# Patient Record
Sex: FEMALE | Race: WHITE | Employment: OTHER | URBAN - METROPOLITAN AREA
[De-identification: names, ages, dates, MRNs, and addresses within clinical notes are randomized per-mention and may not be internally consistent; named-entity substitution may affect disease eponyms.]

---

## 2020-05-21 ENCOUNTER — HOSPITAL ENCOUNTER (OUTPATIENT)
Age: 70
Setting detail: OUTPATIENT SURGERY
Discharge: HOME | End: 2020-05-21
Attending: SPECIALIST | Admitting: SPECIALIST
Payer: MEDICARE

## 2020-05-21 VITALS
OXYGEN SATURATION: 97 % | HEART RATE: 66 BPM | RESPIRATION RATE: 13 BRPM | WEIGHT: 144.4 LBS | SYSTOLIC BLOOD PRESSURE: 110 MMHG | HEIGHT: 64 IN | DIASTOLIC BLOOD PRESSURE: 46 MMHG | BODY MASS INDEX: 24.65 KG/M2

## 2020-05-21 DIAGNOSIS — R07.9 CHEST PAIN, UNSPECIFIED TYPE: ICD-10-CM

## 2020-05-21 LAB
ANION GAP SERPL CALC-SCNC: 5 MMOL/L (ref 5–15)
BASOPHILS # BLD: 0 K/UL (ref 0–0.1)
BASOPHILS NFR BLD: 1 % (ref 0–1)
BUN SERPL-MCNC: 19 MG/DL (ref 6–20)
BUN/CREAT SERPL: 21 (ref 12–20)
CALCIUM SERPL-MCNC: 9.4 MG/DL (ref 8.5–10.1)
CHLORIDE SERPL-SCNC: 106 MMOL/L (ref 97–108)
CO2 SERPL-SCNC: 31 MMOL/L (ref 21–32)
CREAT SERPL-MCNC: 0.9 MG/DL (ref 0.55–1.02)
DIFFERENTIAL METHOD BLD: NORMAL
EOSINOPHIL # BLD: 0.3 K/UL (ref 0–0.4)
EOSINOPHIL NFR BLD: 5 % (ref 0–7)
ERYTHROCYTE [DISTWIDTH] IN BLOOD BY AUTOMATED COUNT: 12.4 % (ref 11.5–14.5)
GLUCOSE SERPL-MCNC: 104 MG/DL (ref 65–100)
HCT VFR BLD AUTO: 38.5 % (ref 35–47)
HGB BLD-MCNC: 13 G/DL (ref 11.5–16)
IMM GRANULOCYTES # BLD AUTO: 0 K/UL (ref 0–0.04)
IMM GRANULOCYTES NFR BLD AUTO: 0 % (ref 0–0.5)
LYMPHOCYTES # BLD: 1.6 K/UL (ref 0.8–3.5)
LYMPHOCYTES NFR BLD: 27 % (ref 12–49)
MAGNESIUM SERPL-MCNC: 2.5 MG/DL (ref 1.6–2.4)
MCH RBC QN AUTO: 32.3 PG (ref 26–34)
MCHC RBC AUTO-ENTMCNC: 33.8 G/DL (ref 30–36.5)
MCV RBC AUTO: 95.5 FL (ref 80–99)
MONOCYTES # BLD: 0.4 K/UL (ref 0–1)
MONOCYTES NFR BLD: 7 % (ref 5–13)
NEUTS SEG # BLD: 3.5 K/UL (ref 1.8–8)
NEUTS SEG NFR BLD: 60 % (ref 32–75)
NRBC # BLD: 0 K/UL (ref 0–0.01)
NRBC BLD-RTO: 0 PER 100 WBC
PLATELET # BLD AUTO: 167 K/UL (ref 150–400)
PMV BLD AUTO: 11.7 FL (ref 8.9–12.9)
POTASSIUM SERPL-SCNC: 3.9 MMOL/L (ref 3.5–5.1)
RBC # BLD AUTO: 4.03 M/UL (ref 3.8–5.2)
SODIUM SERPL-SCNC: 142 MMOL/L (ref 136–145)
WBC # BLD AUTO: 5.8 K/UL (ref 3.6–11)

## 2020-05-21 PROCEDURE — 83735 ASSAY OF MAGNESIUM: CPT

## 2020-05-21 PROCEDURE — 85025 COMPLETE CBC W/AUTO DIFF WBC: CPT

## 2020-05-21 PROCEDURE — 77030004532 HC CATH ANGI DX IMP BSC -A: Performed by: SPECIALIST

## 2020-05-21 PROCEDURE — 80048 BASIC METABOLIC PNL TOTAL CA: CPT

## 2020-05-21 PROCEDURE — 93458 L HRT ARTERY/VENTRICLE ANGIO: CPT | Performed by: SPECIALIST

## 2020-05-21 PROCEDURE — 36415 COLL VENOUS BLD VENIPUNCTURE: CPT

## 2020-05-21 PROCEDURE — 99152 MOD SED SAME PHYS/QHP 5/>YRS: CPT | Performed by: SPECIALIST

## 2020-05-21 PROCEDURE — C1894 INTRO/SHEATH, NON-LASER: HCPCS | Performed by: SPECIALIST

## 2020-05-21 PROCEDURE — 74011636320 HC RX REV CODE- 636/320: Performed by: SPECIALIST

## 2020-05-21 PROCEDURE — 74011000250 HC RX REV CODE- 250: Performed by: SPECIALIST

## 2020-05-21 PROCEDURE — 74011250636 HC RX REV CODE- 250/636: Performed by: SPECIALIST

## 2020-05-21 PROCEDURE — C1760 CLOSURE DEV, VASC: HCPCS | Performed by: SPECIALIST

## 2020-05-21 RX ORDER — CHOLECALCIFEROL (VITAMIN D3) 50 MCG
1 CAPSULE ORAL DAILY
COMMUNITY

## 2020-05-21 RX ORDER — SODIUM CHLORIDE 9 MG/ML
125 INJECTION, SOLUTION INTRAVENOUS CONTINUOUS
Status: DISCONTINUED | OUTPATIENT
Start: 2020-05-21 | End: 2020-05-21 | Stop reason: HOSPADM

## 2020-05-21 RX ORDER — SODIUM CHLORIDE 0.9 % (FLUSH) 0.9 %
5-40 SYRINGE (ML) INJECTION AS NEEDED
Status: DISCONTINUED | OUTPATIENT
Start: 2020-05-21 | End: 2020-05-21 | Stop reason: HOSPADM

## 2020-05-21 RX ORDER — ROSUVASTATIN CALCIUM 5 MG/1
5 TABLET, COATED ORAL
COMMUNITY

## 2020-05-21 RX ORDER — LOSARTAN POTASSIUM AND HYDROCHLOROTHIAZIDE 12.5; 1 MG/1; MG/1
1 TABLET ORAL
COMMUNITY

## 2020-05-21 RX ORDER — MIDAZOLAM HYDROCHLORIDE 1 MG/ML
INJECTION, SOLUTION INTRAMUSCULAR; INTRAVENOUS AS NEEDED
Status: DISCONTINUED | OUTPATIENT
Start: 2020-05-21 | End: 2020-05-21 | Stop reason: HOSPADM

## 2020-05-21 RX ORDER — OMEPRAZOLE 40 MG/1
40 CAPSULE, DELAYED RELEASE ORAL DAILY
COMMUNITY

## 2020-05-21 RX ORDER — DEXTROMETHORPHAN HYDROBROMIDE, GUAIFENESIN 5; 100 MG/5ML; MG/5ML
650 LIQUID ORAL EVERY 8 HOURS
COMMUNITY
End: 2022-06-29

## 2020-05-21 RX ORDER — HYDROXYCHLOROQUINE SULFATE 200 MG/1
200 TABLET, FILM COATED ORAL DAILY
COMMUNITY
End: 2022-07-14

## 2020-05-21 RX ORDER — HEPARIN SODIUM 200 [USP'U]/100ML
INJECTION, SOLUTION INTRAVENOUS
Status: COMPLETED | OUTPATIENT
Start: 2020-05-21 | End: 2020-05-21

## 2020-05-21 RX ORDER — LORAZEPAM 0.5 MG/1
0.5 TABLET ORAL
COMMUNITY

## 2020-05-21 RX ORDER — CHOLECALCIFEROL (VITAMIN D3) 125 MCG
1000 CAPSULE ORAL DAILY
COMMUNITY
End: 2022-06-29

## 2020-05-21 RX ORDER — SPIRONOLACTONE 25 MG
1 TABLET ORAL
COMMUNITY

## 2020-05-21 RX ORDER — GUAIFENESIN 100 MG/5ML
81 LIQUID (ML) ORAL DAILY
COMMUNITY
End: 2022-07-14

## 2020-05-21 RX ORDER — FENTANYL CITRATE 50 UG/ML
INJECTION, SOLUTION INTRAMUSCULAR; INTRAVENOUS AS NEEDED
Status: DISCONTINUED | OUTPATIENT
Start: 2020-05-21 | End: 2020-05-21 | Stop reason: HOSPADM

## 2020-05-21 RX ORDER — DIPHENHYDRAMINE HYDROCHLORIDE 50 MG/ML
25 INJECTION, SOLUTION INTRAMUSCULAR; INTRAVENOUS
Status: DISCONTINUED | OUTPATIENT
Start: 2020-05-21 | End: 2020-05-21 | Stop reason: HOSPADM

## 2020-05-21 RX ORDER — GLUCOSAMINE/CHONDR SU A SOD 750-600 MG
5000 TABLET ORAL DAILY
COMMUNITY

## 2020-05-21 RX ORDER — MIRTAZAPINE 15 MG/1
15 TABLET, FILM COATED ORAL
COMMUNITY

## 2020-05-21 RX ORDER — SODIUM CHLORIDE 0.9 % (FLUSH) 0.9 %
5-40 SYRINGE (ML) INJECTION EVERY 8 HOURS
Status: DISCONTINUED | OUTPATIENT
Start: 2020-05-21 | End: 2020-05-21 | Stop reason: HOSPADM

## 2020-05-21 RX ORDER — ASCORBIC ACID 500 MG
500 TABLET ORAL DAILY
COMMUNITY

## 2020-05-21 RX ORDER — LIDOCAINE HYDROCHLORIDE 10 MG/ML
INJECTION INFILTRATION; PERINEURAL AS NEEDED
Status: DISCONTINUED | OUTPATIENT
Start: 2020-05-21 | End: 2020-05-21 | Stop reason: HOSPADM

## 2020-05-21 RX ORDER — HYDROCORTISONE SODIUM SUCCINATE 100 MG/2ML
100 INJECTION, POWDER, FOR SOLUTION INTRAMUSCULAR; INTRAVENOUS
Status: DISCONTINUED | OUTPATIENT
Start: 2020-05-21 | End: 2020-05-21 | Stop reason: HOSPADM

## 2020-05-21 RX ADMIN — SODIUM CHLORIDE 75 ML/HR: 900 INJECTION, SOLUTION INTRAVENOUS at 10:38

## 2020-05-21 NOTE — ROUTINE PROCESS
Cardiac Cath Lab Recovery Arrival Note:      Tomas Babcock arrived to Cardiac Cath Lab, Recovery Area. Staff introduced to patient. Patient identifiers verified with NAME and DATE OF BIRTH. Procedure verified with patient. Consent forms reviewed and signed by patient or authorized representative and verified. Allergies verified. Patient and family oriented to department. Patient and family informed of procedure and plan of care. Questions answered with review. Patient prepped for procedure, per orders from physician, prior to arrival.    Patient on cardiac monitor, non-invasive blood pressure, SPO2 monitor. On room air. . Patient is A&Ox 3. Patient reports no CP. Patient in stretcher, in low position, with side rails up, call bell within reach, patient instructed to call if assistance as needed. Patient prep in: [facility] Recovery Area, [address]  Prep by: GARFIELD      [id number]    TRANSFER - OUT REPORT:    Verbal report given to Ed on Tomas Babcock being transferred to procedure room for ordered procedure       Report consisted of patients Situation, Background, Assessment and   Recommendations(SBAR). Information from the following report(s) SBAR was reviewed with the receiving nurse. Opportunity for questions and clarification was provided. 1430    Pt. discharge with questions answered and pt/family verbalized understanding. Will call if any questions arise. Patient transported via wheelchair and RN to vehicle.

## 2020-05-21 NOTE — Clinical Note
Patient transported with a Registered Nurse. TRANSFER - OUT REPORT:    Verbal report given to  Odette(name) on [facility]  [place] [facility] [place] of care       Report consisted of patients Situation, Background, Assessment and   Recommendations(SBAR). Information from the following report(s) SBAR was reviewed with the receiving nurse. Lines: @LDA(4,5,6,7,8,12)@     Opportunity for questions and clarification was provided.       Patient transported with:   Registered Nurse

## 2020-05-21 NOTE — DISCHARGE INSTRUCTIONS
Discharge Instructions:     Medications: Activity:     As tolerated except do not lift over 10 pounds for 5 days. Diet:     American Heart Association. Follow-up:     Follow up with Bashir Rios MD on June 4th, 2020 at 9:30am.  [address] [facility] [address]  [phone]      If you smoke, STOP!

## 2020-05-21 NOTE — H&P
Date of Surgery Update:  Lorie Sexton was seen and examined. History and physical has been reviewed. The patient has been examined. There have been no significant clinical changes since the completion of the originally dated History and Physical.    Signed By: Jaimee Diehl MD     May 21, 2020 11:24 AM       CP/SOB. Exercise echo + septum. Lieutenant Jackson 1950   Office/Outpatient Visit  Visit Date: Wed, May 20, 2020 8:30 am  Provider: Mamta Sterling MD (Assistant: Evan Manrique, RN )  Location: [facility])[address] [place]. [address] [phone]    Electronically signed by Edith Meneses MD on  05/20/2020 09:49:47 AM                           Subjective:    CC: Ms. Ashley Vaz is a 68year old White female. Her primary care physician is Angelika Zambrano MD.  Angelika Zambrano MD referred Ms. Pollard to the practice for a consult. She is here to follow up with the doctor regarding previous testing, stress echocardiogram - 5/20/20. She presents today with a complaint of shortness of breath. She is here today following a transition of care from her primary care provider. Medication list reviewed. She has a history of hypertension. HPI:           Regarding hypertension:  Type Primary Hypertension Currently, her treatment regimen consists of an angiotensin receptor blocker ( losartan ) and a diuretic ( hydrochlorothiazide ). Regarding dyspnea/shortness of breath: Associated symptoms include fatigue. MD Notes: Abnormal stress echo: Positive chest pain, positive ECG, septal hypokinesis. Mild MR, mild-moderate TR. Abnormal result of other cardiovascular function study noted. Coronary Artery Disease: Current symptoms include chest pain and shortness of breath. She denies lower extremity edema. Hypercholesterolemia: Current treatment includes Crestor.             Regarding chest pain:   The discomfort is located primarily in the left parasternal region. The pain initially began one year ago. Typically, individual episodes of chest pain are variable in duration. She characterizes the pain as dull. It seems to be worse with exertion. Pain improves with rest.  Associated symptoms include dyspnea. ROS:   GENERAL:  Denies recent weight gain or weight loss. EYES:  Denies double vision. ENT:  Denies tinnitus. No nose bleeding. ENDOCRINE:  Negative for temperature intolerances and excessive sweating. CARDIOVASCULAR:  Please see HPI. RESPIRATORY:   No chronic cough, hemoptysis or pleuritic pain. GI:  No nausea, vomiting, hematemesis, diarrhea or melena. :  No dysuria, polyuria or hematuria. HEMATOLOGIC/LYMPHATIC:  Negative for easy bruising and excessive bleeding. NEUROLOGICAL:  Negative for seizures and vertigo. Past Medical History / Family History / Social History:     Last Reviewed on 2020 08:52 AM by Kira Flores  Past Medical History:     Hypertension   Sleep Apnea: uses CPAP;   Gastroesophageal Reflux Disease   Rheumatoid Arthritis     Surgical History:   Surgical/Procedural History:   Appendectomy  Cataract Removal  SCC excision x 1  Multiple BCE excision -      Family History:   Father:   ; Positive for Lung Cancer; Mother:  Positive for Coronary Artery Disease;   ; Positive for Breast Cancer;   Sister(s):  Positive for Hypertension;     Social History:   Social History:   Occupation: Retired   Marital Status:    Children: 2 children     Tobacco/Alcohol/Supplements:   Last Reviewed on 2020 08:52 AM by Alissa Taylor  TOBACCO/ALCOHOL/SUPPLEMENTS   Tobacco: Past history of cigarette smoking, but has quit. Alcohol: Non-drinker   Caffeine:  She admits to consuming caffeine via tea ( 2 servings per day ).       Substance Abuse History:   Last Reviewed on 2020 08:52 AM by Alissa Smith  Substance Use/Abuse:   None     Mental Health History:   Last Reviewed on 2020 08:52 AM by Alissa Smith    Communicable Diseases (eg STDs): Last Reviewed on 5/20/2020 08:52 AM by Alissa Smith    Current Problems:   Last Reviewed on 5/20/2020 08:52 AM by Alissa Smith  Essential (primary) hypertension  Shortness of breath  Chest pain, unspecified  Abnormal stress echocardiogram (ECHO)  Shortness of breath    Allergies:   Last Reviewed on 5/20/2020 08:52 AM by Alissa Smith  Penicillins:    Ceclor:    Keflex:    Mobic:      Current Medications:   Last Reviewed on 5/20/2020 08:52 AM by Alissa Smith  losartan-hydrochlorothiazide 100-12.5 mg oral tablet [take 1 tablet by oral route once daily]  aspirin 81 mg oral tablet, delayed release (enteric coated) [take 1 tablet (81 mg) by oral route once daily]  rosuvastatin 5 mg oral tablet [take 1 tablet (5 mg) by oral route once daily]  omeprazole 40 mg oral capsule,delayed release (enteric coated) [take 1 capsule (40 mg) by oral route once daily before a meal]  LORazepam 0.5 mg oral tablet [1 po q8h prn]  mirtazapine 15 mg oral tablet [take 1 tablet (15 mg) by oral route once daily before bedtime]  hydroxychloroquine 200 mg oral tablet [take 1 tablet (200 mg) by oral route once daily]  Multivitamin    Biotin   Vitamin C   glucosamine-chondroitin   Fish oil     Objective:    Vitals:     Current: 5/20/2020 8:53:07 AM  Ht:  5 ft, 4 in; Wt: 162 lbs;  BMI: 27. 8BP: 119/69 mm Hg (left arm, sitting);  P: 78 bpm    Repeat:   8:53:15 AM  BP:   129/71mm Hg (left arm, standing)   Exams:   GENERAL:  Alert, oriented to person, place and time. HEENT:  Pinkish palpebral  conjunctivae. Anicteric sclerae. NECK:  No jugular vein engorgement. No bruit. CHEST: Equal expansion. Clear breath sounds. No rales, no wheezing. Heart: Reg rate and rhythm. Grade 2/6 systolic ejection murmur at the left sternal border area. ABDOMEN:  Soft. Normal active bowel sounds. No tenderness. EXTREMITIES:  No pitting pedal edema. Equal pulses bilaterally.     NEURO: Grossly intact. Assessment:     I10   Essential (primary) hypertension     R06.02   Shortness of breath     R94.39   Abnormal result of other cardiovascular function study     I25.118   Atherosclerotic heart disease of native coronary artery with other forms of angina pectoris     E78.00   Pure hypercholesterolemia, unspecified     R07.2   Precordial pain       ORDERS:     Procedures Ordered:     53707  Education and train for pt self-mgmt by qualified, nonphysician, ea 30 minutes; individual pt  (Send-Out)          Reinaldo Castañeda  Cardiac Cath  (In-House)          Other Orders:     [id number]  Queried Patient for Tobacco Use  (Send-Out)          4086F  Aspirin or clopidogrel prescribed (CAD)  (Send-Out)              Plan:     Precordial pain  CAD # 6: CAD w/ Antiplatelet Therapy Aspirin or Clopidogrel Prescribed 1. Medication list has been reviewed. Start the following medication(s):  metoprolol. Smoking Status:  Nonsmoker   2. Advised the patient regarding diet, exercise, and lifestyle modification. 3.  The patient to call the office if there is any change in her cardiac symptoms. 4.  Explained to the patient the importance of controlling her cardiac risk factors. Testing/Procedures: Cardiac Catheterization  Explained to the patient the indication, procedure, risks, and benefits of cardiac catheterization. The patient understands  and wishes to proceed with the cath to be performed as an outpatient this week at [facility] by Dr. Venu Guillermo. Schedule a follow up appointment in 2 weeks.         Orders:     [id number]  Queried Patient for Tobacco Use  (Send-Out)          26260  Education and train for pt self-mgmt by qualified, nonphysician, ea 30 minutes; individual pt  (Send-Out)          XCATH  Cardiac Cath  (In-House)          1709E  Aspirin or clopidogrel prescribed (CAD)  (Send-Out)            Other Patient Education Handouts:     COV Heart Healthy Diet      Patient Recommendations: For  Precordial pain:    1. Your medication list has been reviewed. Start the following medication(s):  metoprolol. 2.  You have been advised regarding diet, exercise, and lifestyle. modification. 3.  Please call the office if there is any change in your cardiac symptoms. 4.  Explained to you the importance of controlling your cardiac risk factors. You need to have the following test/procedure(s) done: Cardiac Catheterization:  The patient understands and wishes to proceed with the cath to be performed as an outpatient this week. at [facility] by Dr. Ele Maurer. Schedule a follow-up visit in 2 weeks.

## 2020-05-21 NOTE — Clinical Note
TRANSFER - IN REPORT:    Verbal report received from Saira Mckeon CLPO(name) on [facility]  being received from clpo(unit) for routine progression of care      Report consisted of patients Situation, Background, Assessment and   Recommendations(SBAR). Information from the following report(s) SBAR was reviewed with the receiving nurse. Opportunity for questions and clarification was provided. Assessment completed upon patients arrival to unit and care assumed.

## 2020-05-21 NOTE — Clinical Note
Single view of the left ventricle obtained using power injection. Total volume = 30 mL. Rate = 10 mL/sec. Pressure = 900 PSI. Rate of rise = 0.5 sec.

## 2020-05-21 NOTE — PROCEDURES
Cath:  Obstructive single-vessel (small vessel) disease     LAD patent, but occluded small septal (tooo small for PCI). LCx patent     RCA patent  Normal LVF (EF 60%). No AVG, MR 1+.   RFA angioseal.    Med mgmt  Cont BB, ASA, statin    F/U with Dr. Nini Worthy 6/4/20 @ 9:30am.

## 2021-12-16 ENCOUNTER — OFFICE VISIT (OUTPATIENT)
Dept: ORTHOPEDIC SURGERY | Age: 71
End: 2021-12-16
Payer: MEDICARE

## 2021-12-16 VITALS — HEIGHT: 64 IN | WEIGHT: 160 LBS | BODY MASS INDEX: 27.31 KG/M2

## 2021-12-16 DIAGNOSIS — M17.11 ARTHRITIS OF KNEE, RIGHT: ICD-10-CM

## 2021-12-16 DIAGNOSIS — M17.12 ARTHRITIS OF LEFT KNEE: Primary | ICD-10-CM

## 2021-12-16 PROCEDURE — 1101F PT FALLS ASSESS-DOCD LE1/YR: CPT | Performed by: ORTHOPAEDIC SURGERY

## 2021-12-16 PROCEDURE — G8427 DOCREV CUR MEDS BY ELIG CLIN: HCPCS | Performed by: ORTHOPAEDIC SURGERY

## 2021-12-16 PROCEDURE — 20610 DRAIN/INJ JOINT/BURSA W/O US: CPT | Performed by: ORTHOPAEDIC SURGERY

## 2021-12-16 PROCEDURE — 3017F COLORECTAL CA SCREEN DOC REV: CPT | Performed by: ORTHOPAEDIC SURGERY

## 2021-12-16 PROCEDURE — 1090F PRES/ABSN URINE INCON ASSESS: CPT | Performed by: ORTHOPAEDIC SURGERY

## 2021-12-16 PROCEDURE — G8400 PT W/DXA NO RESULTS DOC: HCPCS | Performed by: ORTHOPAEDIC SURGERY

## 2021-12-16 PROCEDURE — G8432 DEP SCR NOT DOC, RNG: HCPCS | Performed by: ORTHOPAEDIC SURGERY

## 2021-12-16 PROCEDURE — G8419 CALC BMI OUT NRM PARAM NOF/U: HCPCS | Performed by: ORTHOPAEDIC SURGERY

## 2021-12-16 PROCEDURE — G8536 NO DOC ELDER MAL SCRN: HCPCS | Performed by: ORTHOPAEDIC SURGERY

## 2021-12-16 PROCEDURE — 99214 OFFICE O/P EST MOD 30 MIN: CPT | Performed by: ORTHOPAEDIC SURGERY

## 2021-12-16 RX ORDER — METHYLPREDNISOLONE ACETATE 80 MG/ML
80 INJECTION, SUSPENSION INTRA-ARTICULAR; INTRALESIONAL; INTRAMUSCULAR; SOFT TISSUE ONCE
Status: COMPLETED | OUTPATIENT
Start: 2021-12-16 | End: 2021-12-16

## 2021-12-16 RX ORDER — ISOSORBIDE DINITRATE 5 MG/1
30 TABLET ORAL DAILY
COMMUNITY

## 2021-12-16 RX ORDER — BISMUTH SUBSALICYLATE 262 MG
1 TABLET,CHEWABLE ORAL DAILY
COMMUNITY

## 2021-12-16 RX ADMIN — METHYLPREDNISOLONE ACETATE 80 MG: 80 INJECTION, SUSPENSION INTRA-ARTICULAR; INTRALESIONAL; INTRAMUSCULAR; SOFT TISSUE at 15:32

## 2021-12-16 RX ADMIN — METHYLPREDNISOLONE ACETATE 80 MG: 80 INJECTION, SUSPENSION INTRA-ARTICULAR; INTRALESIONAL; INTRAMUSCULAR; SOFT TISSUE at 15:33

## 2021-12-16 NOTE — PROGRESS NOTES
Dain Cruz (: 1950) is a 66 y.o. female patient, here for evaluation of the following chief complaint(s):  Knee Pain (bilateral knee pain)       ASSESSMENT/PLAN:  Below is the assessment and plan developed based on review of pertinent history, physical exam, labs, studies, and medications. 55-year-old female with bilateral knee osteoarthritis. She has done well with injections. She is requesting hyaluronic acid and steroid today. I injected gel 1 as well as 80 mg Depo-Medrol in each knee. She tolerated well. Follow-up as needed      1. Arthritis of left knee  -     DRAIN/INJECT LARGE JOINT/BURSA  2. Arthritis of knee, right  -     DRAIN/INJECT LARGE JOINT/BURSA      Encounter Diagnoses   Name Primary?  Arthritis of left knee Yes    Arthritis of knee, right         No follow-ups on file. SUBJECTIVE/OBJECTIVE:  Dian Cruz (: 1950) is a 66 y.o. female who presents today for the following:  Chief Complaint   Patient presents with    Knee Pain     bilateral knee pain       55-year-old female comes today for follow-up. She has bilateral knee osteoarthritis. She has been treating it with conservative treatment including injections. These helped significantly. The pain is rated as moderate described as sharp. She can walk less than 5 blocks without pain. She has also had physical therapy in the past.    IMAGING:  XR Results (most recent):  No results found for this or any previous visit. Allergies   Allergen Reactions    Ceclor [Cefaclor] Rash    Keflex [Cephalexin] Rash    Mobic [Meloxicam] Hives    Penicillamine Rash       Current Outpatient Medications   Medication Sig    isosorbide dinitrate (ISORDIL) 5 mg tablet Take  by mouth three (3) times daily.  B infantis/B ani/B katlin/B bifid (PROBIOTIC 4X PO) Take  by mouth.  multivitamin (ONE A DAY) tablet Take 1 Tablet by mouth daily.  TURMERIC PO Take  by mouth.     glucosamine sulfate (GLUCOSAMINE PO) Take  by mouth.  CRANBERRY PO Take  by mouth.  losartan-hydroCHLOROthiazide (HYZAAR) 100-12.5 mg per tablet Take 1 Tab by mouth daily.  mirtazapine (REMERON) 15 mg tablet Take 15 mg by mouth nightly.  hydroxychloroquine (PLAQUENIL) 200 mg tablet Take 200 mg by mouth daily.  rosuvastatin (CRESTOR) 5 mg tablet Take 5 mg by mouth nightly.  Biotin 2,500 mcg cap Take 5 g by mouth daily.  omega 3-dha-epa-fish oil (Fish Oil) 100-160-1,000 mg cap Take 1 Cap by mouth daily.  Calcium-Cholecalciferol, D3, (Calcium 600 with Vitamin D3) 600 mg(1,500mg) -400 unit chew Take 1 Tab by mouth.  aspirin 81 mg chewable tablet Take 81 mg by mouth daily.  cholecalciferol, vitamin D3, 50 mcg (2,000 unit) tab Take 1,000 Int'l Units by mouth daily.  acetaminophen (TYLENOL) 650 mg TbER Take 650 mg by mouth every eight (8) hours.  ascorbic acid, vitamin C, (Vitamin C) 500 mg tablet Take 500 mg by mouth daily.  omeprazole (PRILOSEC) 40 mg capsule Take 40 mg by mouth daily. (Patient not taking: Reported on 12/16/2021)    LORazepam (ATIVAN) 0.5 mg tablet Take 0.5 mg by mouth.  (Patient not taking: Reported on 12/16/2021)     Current Facility-Administered Medications   Medication    methylPREDNISolone acetate (DEPO-MEDROL) 80 mg/mL injection 80 mg    hyaluronate sodium, cross-linked (GEL-ONE) injection syrg 30 mg    methylPREDNISolone acetate (DEPO-MEDROL) 80 mg/mL injection 80 mg    hyaluronate sodium, cross-linked (GEL-ONE) injection syrg 30 mg       Past Medical History:   Diagnosis Date    Hypertension     Rheumatoid arteritis ([address]         Past Surgical History:   Procedure Laterality Date    HX APPENDECTOMY         Family History   Problem Relation Age of Onset    Heart Disease Mother     Cancer Father         Social History     Tobacco Use    Smoking status: Former Smoker    Smokeless tobacco: Never Used   Substance Use Topics    Alcohol use: Not Currently        All systems reviewed x 12 and were negative with the exception of none      No flowsheet data found. Vitals:  Ht 5' 4\" (1.626 m)   Wt 160 lb (72.6 kg)   BMI 27.46 kg/m²    Body mass index is 27.46 kg/m². Physical Exam    General: NAD, well developed, well nourished, alert and oriented x 3. Cardiac: Extremities well perfused    Respiratory: Nonlabored breathing    LLE: Normal gait and station. Negative stinchfield. No effusion noted. No previous incisions noted. ROM 0-120 degrees. Grossly stable to varus/valgus stress and anterior/posterior drawer tests. Negative McMurrays. Motor grossly intact. RLE: Normal gait and station. Negative stinchfield. No effusion noted. No previous incisions noted. ROM 0-120 degrees. Grossly stable to varus/valgus stress and anterior/posterior drawer tests. Negative McMurrays. Motor grossly intact. Vascular: Palpable pedal pulses, equal bilaterally. Skin: Warm well perfused, cap refill < 2 sec. An electronic signature was used to authenticate this note.   -- Jonathan Sousa MD

## 2022-04-14 ENCOUNTER — OFFICE VISIT (OUTPATIENT)
Dept: ORTHOPEDIC SURGERY | Age: 72
End: 2022-04-14
Payer: MEDICARE

## 2022-04-14 VITALS — BODY MASS INDEX: 26.46 KG/M2 | WEIGHT: 155 LBS | HEIGHT: 64 IN

## 2022-04-14 DIAGNOSIS — M17.11 ARTHRITIS OF KNEE, RIGHT: ICD-10-CM

## 2022-04-14 DIAGNOSIS — G89.29 CHRONIC PAIN OF BOTH KNEES: Primary | ICD-10-CM

## 2022-04-14 DIAGNOSIS — M17.12 ARTHRITIS OF LEFT KNEE: ICD-10-CM

## 2022-04-14 DIAGNOSIS — M25.562 CHRONIC PAIN OF BOTH KNEES: Primary | ICD-10-CM

## 2022-04-14 DIAGNOSIS — M25.561 CHRONIC PAIN OF BOTH KNEES: Primary | ICD-10-CM

## 2022-04-14 PROCEDURE — 20610 DRAIN/INJ JOINT/BURSA W/O US: CPT | Performed by: ORTHOPAEDIC SURGERY

## 2022-04-14 RX ORDER — HYDROGEN PEROXIDE 3 %
SOLUTION, NON-ORAL MISCELLANEOUS
COMMUNITY
End: 2022-06-29

## 2022-04-14 RX ORDER — TRIAMCINOLONE ACETONIDE 40 MG/ML
40 INJECTION, SUSPENSION INTRA-ARTICULAR; INTRAMUSCULAR ONCE
Status: COMPLETED | OUTPATIENT
Start: 2022-04-14 | End: 2022-04-14

## 2022-04-14 RX ADMIN — TRIAMCINOLONE ACETONIDE 40 MG: 40 INJECTION, SUSPENSION INTRA-ARTICULAR; INTRAMUSCULAR at 15:18

## 2022-04-14 NOTE — PROGRESS NOTES
Karen Wiggins (: 1950) is a 66 y.o. female patient, here for evaluation of the following chief complaint(s):  Knee Pain (bilateral knee pain)       ASSESSMENT/PLAN:  Below is the assessment and plan developed based on review of pertinent history, physical exam, labs, studies, and medications. 68-year-old female comes in today with bilateral knee osteoarthritis. Both knees bother her significantly. She has been doing injections which have helped but these have been temporary. She has severe bilateral medial joint space narrowing with osteophyte formation present. She has now had conservative treatment for more than a year. We discussed total knee arthroplasty. She would like to move forward with this this summer. In the meantime she asked for injections. I injected 40 mg of triamcinolone into each knee joint using sterile technique. She tolerated well. We also gave her information for surgery and we will plan for moving forward in July. Risks and benefits of joint arthroplasty discussed at length including but not limited to bleeding, need for blood transfusion, infection, damage to surrounding structures, intra-operative fracture, blood clots, pulmonary embolism, death. The patient understands the risks of surgery. All questions answered. Based on which knee is hurting her worse when she schedules we can move forward with that knee. 1. Chronic pain of both knees  -     XR KNEE LT MIN 4 V; Future  2. Arthritis of left knee  -     DRAIN/INJECT LARGE JOINT/BURSA  3. Arthritis of knee, right  -     DRAIN/INJECT LARGE JOINT/BURSA      Encounter Diagnoses   Name Primary?  Chronic pain of both knees Yes    Arthritis of left knee     Arthritis of knee, right         No follow-ups on file.       SUBJECTIVE/OBJECTIVE:  Karen Wiggins (: 1950) is a 66 y.o. female who presents today for the following:  Chief Complaint   Patient presents with    Knee Pain     bilateral knee pain 66-year-old female comes in today for follow-up. She has bilateral knee osteoarthritis. She has been dealing with this conservatively. She is had injections in the past.  She is also had physical therapy. She has had conservative treatment for more than a year. Pain is rated as moderate is continuous. It is described as sharp. It does not cause her to fall. She can walk 5 blocks or less without pain. She uses a railing while climbing stairs. She came in today to discuss the possibility of total knee arthroplasty. IMAGING:  XR Results (most recent):  Results from Appointment encounter on 04/14/22    XR KNEE LT MIN 4 V    Narrative  4 views left knee ordered and reviewed. Each knee reveals tricompartmental knee osteoarthritis. Severe medial joint space narrowing with large osteophyte formation and subchondral cyst formation present. Moderate patellofemoral osteoarthritis bilaterally. Allergies   Allergen Reactions    Ceclor [Cefaclor] Rash    Keflex [Cephalexin] Rash    Mobic [Meloxicam] Hives    Penicillamine Rash       Current Outpatient Medications   Medication Sig    isosorbide dinitrate (ISORDIL) 5 mg tablet Take  by mouth three (3) times daily.  B infantis/B ani/B katlin/B bifid (PROBIOTIC 4X PO) Take  by mouth.  multivitamin (ONE A DAY) tablet Take 1 Tablet by mouth daily.  TURMERIC PO Take  by mouth.  glucosamine sulfate (GLUCOSAMINE PO) Take  by mouth.  CRANBERRY PO Take  by mouth.  losartan-hydroCHLOROthiazide (HYZAAR) 100-12.5 mg per tablet Take 1 Tab by mouth daily.  mirtazapine (REMERON) 15 mg tablet Take 15 mg by mouth nightly.  hydroxychloroquine (PLAQUENIL) 200 mg tablet Take 200 mg by mouth daily.  rosuvastatin (CRESTOR) 5 mg tablet Take 5 mg by mouth nightly.  Biotin 2,500 mcg cap Take 5 g by mouth daily.  omega 3-dha-epa-fish oil (Fish Oil) 100-160-1,000 mg cap Take 1 Cap by mouth daily.     Calcium-Cholecalciferol, D3, (Calcium 600 with Vitamin D3) 600 mg(1,500mg) -400 unit chew Take 1 Tab by mouth.  aspirin 81 mg chewable tablet Take 81 mg by mouth daily.  cholecalciferol, vitamin D3, 50 mcg (2,000 unit) tab Take 1,000 Int'l Units by mouth daily.  acetaminophen (TYLENOL) 650 mg TbER Take 650 mg by mouth every eight (8) hours.  ascorbic acid, vitamin C, (Vitamin C) 500 mg tablet Take 500 mg by mouth daily.  esomeprazole (NEXIUM) 20 mg capsule     omeprazole (PRILOSEC) 40 mg capsule Take 40 mg by mouth daily. (Patient not taking: Reported on 12/16/2021)    LORazepam (ATIVAN) 0.5 mg tablet Take 0.5 mg by mouth. (Patient not taking: Reported on 4/14/2022)     Current Facility-Administered Medications   Medication    triamcinolone acetonide (KENALOG-40) 40 mg/mL injection 40 mg    triamcinolone acetonide (KENALOG-40) 40 mg/mL injection 40 mg       Past Medical History:   Diagnosis Date    Hypertension     Rheumatoid arteritis ([address]         Past Surgical History:   Procedure Laterality Date    HX APPENDECTOMY         Family History   Problem Relation Age of Onset    Heart Disease Mother     Cancer Father         Social History     Tobacco Use    Smoking status: Former Smoker    Smokeless tobacco: Never Used   Substance Use Topics    Alcohol use: Not Currently        All systems reviewed x 12 and were negative with the exception of None      No flowsheet data found. Vitals:  Ht 5' 4\" (1.626 m)   Wt 155 lb (70.3 kg)   BMI 26.61 kg/m²    Body mass index is 26.61 kg/m². Physical Exam    General: NAD, well developed, well nourished, alert and oriented x 3. Cardiac: Extremities well perfused    Respiratory: Nonlabored breathing    LLE: Mild antalgic gait. Mild effusion noted. No previous incisions noted. ROM 0-110 degrees. Grossly stable to varus/valgus stress and anterior/posterior drawer tests. Medial and lateral joint tender. Motor grossly intact. RLE:Mild antalgic gait. Mild effusion noted.  No previous incisions noted. ROM 0-110 degrees. Grossly stable to varus/valgus stress and anterior/posterior drawer tests. Medial and lateral joint tender. Motor grossly intact. Vascular: Palpable pedal pulses, equal bilaterally. Skin: Warm well perfused, cap refill < 2 sec. An electronic signature was used to authenticate this note.   -- Julia Rae MD

## 2022-04-20 DIAGNOSIS — M17.12 ARTHRITIS OF LEFT KNEE: Primary | ICD-10-CM

## 2022-06-29 ENCOUNTER — HOSPITAL ENCOUNTER (OUTPATIENT)
Dept: PREADMISSION TESTING | Age: 72
Discharge: HOME | End: 2022-06-29
Payer: MEDICARE

## 2022-06-29 VITALS
SYSTOLIC BLOOD PRESSURE: 129 MMHG | HEIGHT: 64 IN | HEART RATE: 72 BPM | TEMPERATURE: 98.6 F | DIASTOLIC BLOOD PRESSURE: 66 MMHG | BODY MASS INDEX: 28.45 KG/M2 | OXYGEN SATURATION: 98 % | WEIGHT: 166.67 LBS | RESPIRATION RATE: 12 BRPM

## 2022-06-29 LAB
ABO + RH BLD: NORMAL
ANION GAP SERPL CALC-SCNC: 4 MMOL/L (ref 5–15)
APPEARANCE UR: CLEAR
ATRIAL RATE: 63 BPM
BACTERIA URNS QL MICRO: NEGATIVE /HPF
BILIRUB UR QL: NEGATIVE
BLOOD GROUP ANTIBODIES SERPL: NORMAL
BUN SERPL-MCNC: 21 MG/DL (ref 6–20)
BUN/CREAT SERPL: 22 (ref 12–20)
CALCIUM SERPL-MCNC: 9.9 MG/DL (ref 8.5–10.1)
CALCULATED P AXIS, ECG09: 27 DEGREES
CALCULATED R AXIS, ECG10: -18 DEGREES
CALCULATED T AXIS, ECG11: 8 DEGREES
CHLORIDE SERPL-SCNC: 104 MMOL/L (ref 97–108)
CO2 SERPL-SCNC: 32 MMOL/L (ref 21–32)
COLOR UR: NORMAL
CREAT SERPL-MCNC: 0.97 MG/DL (ref 0.55–1.02)
DIAGNOSIS, 93000: NORMAL
EPITH CASTS URNS QL MICRO: NORMAL /LPF
ERYTHROCYTE [DISTWIDTH] IN BLOOD BY AUTOMATED COUNT: 13.4 % (ref 11.5–14.5)
EST. AVERAGE GLUCOSE BLD GHB EST-MCNC: 117 MG/DL
GLUCOSE SERPL-MCNC: 116 MG/DL (ref 65–100)
GLUCOSE UR STRIP.AUTO-MCNC: NEGATIVE MG/DL
HBA1C MFR BLD: 5.7 % (ref 4–5.6)
HCT VFR BLD AUTO: 39.2 % (ref 35–47)
HGB BLD-MCNC: 12.9 G/DL (ref 11.5–16)
HGB UR QL STRIP: NEGATIVE
HYALINE CASTS URNS QL MICRO: NORMAL /LPF (ref 0–5)
INR PPP: 1 (ref 0.9–1.1)
KETONES UR QL STRIP.AUTO: NEGATIVE MG/DL
LEUKOCYTE ESTERASE UR QL STRIP.AUTO: NEGATIVE
MCH RBC QN AUTO: 32.4 PG (ref 26–34)
MCHC RBC AUTO-ENTMCNC: 32.9 G/DL (ref 30–36.5)
MCV RBC AUTO: 98.5 FL (ref 80–99)
NITRITE UR QL STRIP.AUTO: NEGATIVE
NRBC # BLD: 0 K/UL (ref 0–0.01)
NRBC BLD-RTO: 0 PER 100 WBC
P-R INTERVAL, ECG05: 192 MS
PH UR STRIP: 5.5 [PH] (ref 5–8)
PLATELET # BLD AUTO: 194 K/UL (ref 150–400)
PMV BLD AUTO: 11.5 FL (ref 8.9–12.9)
POTASSIUM SERPL-SCNC: 4.2 MMOL/L (ref 3.5–5.1)
PROT UR STRIP-MCNC: NEGATIVE MG/DL
PROTHROMBIN TIME: 10.8 SEC (ref 9–11.1)
Q-T INTERVAL, ECG07: 420 MS
QRS DURATION, ECG06: 80 MS
QTC CALCULATION (BEZET), ECG08: 429 MS
RBC # BLD AUTO: 3.98 M/UL (ref 3.8–5.2)
RBC #/AREA URNS HPF: NORMAL /HPF (ref 0–5)
SODIUM SERPL-SCNC: 140 MMOL/L (ref 136–145)
SP GR UR REFRACTOMETRY: 1.02 (ref 1–1.03)
SPECIMEN EXP DATE BLD: NORMAL
UA: UC IF INDICATED,UAUC: NORMAL
UROBILINOGEN UR QL STRIP.AUTO: 0.2 EU/DL (ref 0.2–1)
VENTRICULAR RATE, ECG03: 63 BPM
WBC # BLD AUTO: 6.6 K/UL (ref 3.6–11)
WBC URNS QL MICRO: NORMAL /HPF (ref 0–4)

## 2022-06-29 PROCEDURE — 83036 HEMOGLOBIN GLYCOSYLATED A1C: CPT

## 2022-06-29 PROCEDURE — 85027 COMPLETE CBC AUTOMATED: CPT

## 2022-06-29 PROCEDURE — 86900 BLOOD TYPING SEROLOGIC ABO: CPT

## 2022-06-29 PROCEDURE — 93005 ELECTROCARDIOGRAM TRACING: CPT

## 2022-06-29 PROCEDURE — 80048 BASIC METABOLIC PNL TOTAL CA: CPT

## 2022-06-29 PROCEDURE — 81001 URINALYSIS AUTO W/SCOPE: CPT

## 2022-06-29 PROCEDURE — 85610 PROTHROMBIN TIME: CPT

## 2022-06-29 RX ORDER — CHOLECALCIFEROL (VITAMIN D3) 125 MCG
1 CAPSULE ORAL DAILY
COMMUNITY

## 2022-06-29 RX ORDER — DEXTROMETHORPHAN HYDROBROMIDE, GUAIFENESIN 5; 100 MG/5ML; MG/5ML
1300 LIQUID ORAL EVERY 8 HOURS
COMMUNITY
End: 2022-07-14

## 2022-06-29 NOTE — PERIOP NOTES
Preoperative instructions reviewed with patient. Patient given two bottles of CHG soap. Instructions reviewed on use of CHG soap. Patient given SSI infection FAQS sheet, as well as a MRSA/MSSA treatment instruction sheet with an explanation to patient that they will be notified if treatment instructions need to be initiated. Patient was given the opportunity to ask questions on the information provided. Patient had COIVD on 6/12/22. Today she states she has persistent weakness, palpitations and nausea. Instructed her to call Dr. Tj Cordova office with this information. PCP visit on 6/6/22 did EKG however they were not able to locate the EKG to fax to me today. , so EKG completed her in PAT. They did fax the office note and it is placed on chart. Notes from cardiology, Dr. Juliann Celeste, received and placed on chart, last office visit on 4/18/22 and they did not do EKG at that visit. 24-hour Holter Monitor is to be done on 6/30/22. Patient has not received equipment yet but said it should \"come today\".

## 2022-06-29 NOTE — PERIOP NOTES
[facility] INSTRUCTIONS  ORTHOPAEDIC    Surgery Date:   7/6/22    Your surgeon's office or [facility] staff will call you between 4 PM- 8 PM the day before surgery with your arrival time. If your surgery is on a Monday, you will receive a call the preceding Friday. 1. Please report to [facility] Patient Access/Admitting on the 1st floor. Bring your insurance card, photo identification, and any copayment (if applicable). 2. If you are going home the same day of your surgery, you must have a responsible adult to drive you home. You need to have a responsible adult to stay with you the first 24 hours after surgery and you should not drive a car for 24 hours following your surgery. 3. Do NOT eat any solid foods after midnight the night before surgery including candy, mints or gum. You may drink clear liquids from midnight until 1 hour prior to arrival time. You may drink up to 12 ounces at one time every 4 hours. 4. Do NOT drink alcohol or smoke 24 hours before surgery. STOP smoking for 14 days prior as it helps with breathing and healing after surgery. 5. If your arrival time is 3pm or later, you may eat a light breakfast before 8am (toast, bagel-no butter, black coffee, plain tea, fruit juice-no pulp) Please note special instructions, if applicable, below for medications. 6. If you are being admitted to the hospital,please leave personal belongings/luggage in your car until you have an assigned hospital room number. 7. Please wear comfortable clothes. Wear your glasses instead of contacts. We ask that all money, jewelry and valuables be left at home. Wear no make up, particularly mascara, the day of surgery. 8.  All body piercings, rings, and jewelry need to be removed and left at home. Please remove any nail polish or artificial nails from your fingernails. Please wear your hair loose or down. Please no pony-tails, buns, or any metal hair accessories.  If you shower the morning of surgery, please do not apply any lotions or powders afterwards. You may wear deodorant. Do not shave any body area within 24 hours of your surgery. 9. Please follow all instructions to avoid any potential surgical cancellation. 10. Should your physical condition change, (i.e. fever, cold, flu, etc.) please notify your surgeon as soon as possible. 11. It is important to be on time. If a situation occurs where you may be delayed, please call:  [phone] / [phone] on the day of surgery. 12. The Preadmission Testing staff can be reached at [phone]. 13. Special instructions: BRING WALKER DAY OF SURGERY. CALL DR. RODRIGUEZ'S OFFICE TO INFORM THEM OF RECENT COVID ILLNESS AND PERSISTENT SYMPTOMS. Current Outpatient Medications   Medication Sig    cholecalciferol, vitamin D3, (Vitamin D3) 50 mcg (2,000 unit) tab Take 1 Tablet by mouth daily.  acetaminophen (Tylenol Arthritis Pain) 650 mg TbER Take 1,300 mg by mouth every eight (8) hours.  isosorbide dinitrate (ISORDIL) 5 mg tablet Take 30 mg by mouth daily.  B infantis/B ani/B katlin/B bifid (PROBIOTIC 4X PO) Take 1 Tablet by mouth daily.  multivitamin (ONE A DAY) tablet Take 1 Tablet by mouth daily.  TURMERIC PO Take 1 Tablet by mouth daily.  glucosamine sulfate (GLUCOSAMINE PO) Take 1 Tablet by mouth daily. 500MG    CRANBERRY PO Take 1 Tablet by mouth daily.  losartan-hydroCHLOROthiazide (HYZAAR) 100-12.5 mg per tablet Take 1 Tablet by mouth nightly.  omeprazole (PRILOSEC) 40 mg capsule Take  by mouth daily.  mirtazapine (REMERON) 15 mg tablet Take 15 mg by mouth nightly.  hydroxychloroquine (PLAQUENIL) 200 mg tablet Take 200 mg by mouth daily. Indications: rheumatoid arthritis    rosuvastatin (CRESTOR) 5 mg tablet Take 5 mg by mouth nightly.  LORazepam (ATIVAN) 0.5 mg tablet Take 0.5 mg by mouth every eight (8) hours as needed.  Biotin 2,500 mcg cap Take 5,000 mcg by mouth daily.     omega 3-dha-epa-fish oil (Fish Oil) 100-160-1,000 mg cap Take 1 Cap by mouth daily.  Calcium-Cholecalciferol, D3, (Calcium 600 with Vitamin D3) 600 mg(1,500mg) -400 unit chew Take 1 Tab by mouth.  aspirin 81 mg chewable tablet Take 81 mg by mouth daily.  ascorbic acid, vitamin C, (Vitamin C) 500 mg tablet Take 500 mg by mouth daily. No current facility-administered medications for this encounter. 1. YOU MUST ONLY TAKE THESE MEDICATIONS THE MORNING OF SURGERY WITH A SIP OF WATER: PRILOSEC, ISOSORBIDE, HYDROXYCHLOROQUINE, TYLENOL  2. MEDICATIONS TO TAKE THE MORNING OF SURGERY ONLY IF NEEDED: LORAZEPAM (ATIVAN)  3. HOLD these prescription medications BEFORE Surgery: ALL VITAMINS AND SUPPLEMENTS-STOP FOR ONE WEEK PRIOR TO SURGERY. 4. Ask your surgeon/prescribing physician about when/if to STOP taking these medications: N/A  5. Stop any non-steroidal anti-inflammatory drugs (i.e. Ibuprofen, Naproxen, Advil, Aleve) 3 days before surgery. You may take Tylenol. STOP all vitamins and herbal supplements 1 week prior to  surgery. 6. If you are currently taking Plavix, Coumadin, or any other blood-thinning/anticoagulant medication contact your prescribing physician for instructions. Preventing Infections Before and After - Your Surgery    IMPORTANT INSTRUCTIONS    You play an important role in your health and preparation for surgery. To reduce the germs on your skin you will need to shower with CHG soap (Chorhexidine gluconate 4%) two times before surgery. CHG soap (Hibiclens, Hex-A-Clens or store brand)   CHG soap will be provided at your Preadmission Testing (PAT) appointment.  If you do not have a PAT appointment before surgery, you may arrange to  CHG soap from our office or purchase CHG soap at a pharmacy, grocery or department store.  You need to purchase TWO 4 ounce bottles to use for your 2 showers. Steps to follow:  1.  Wash your hair with your normal shampoo and your body with regular soap and rinse well to remove shampoo and soap from your skin. 2. Wet a clean washcloth and turn off the shower. 3. Put CHG soap on washcloth and apply to your entire body from the neck down. Do not use on your head, face or private parts(genitals). Do not use CHG soap on open sores, wounds or areas of skin irritation. 4. Wash you body gently for 5 minutes. Do not wash your skin too hard. This soap does not create lather. Pay special attention to your underarms and from your belly button to your feet. 5. Turn the shower back on and rinse well to get CHG soap off your body. 6. Pat your skin dry with a clean, dry towel. Do not apply lotions or moisturizer. 7. Put on clean clothes and sleep on fresh bed sheets and do not allow pets to sleep with you. Shower with CHG soap 2 times before your surgery   The evening before your surgery   The morning of your surgery      Tips to help prevent infections after your surgery:  1. Protect your surgical wound from germs:  ? Hand washing is the most important thing you and your caregivers can do to prevent infections. ? Keep your bandage clean and dry! ? Do not touch your surgical wound. 2. Use clean, freshly washed towels and washcloths every time you shower; do not share bath linens with others. 3. Until your surgical wound is healed, wear clothing and sleep on bed linens each day that are clean and freshly washed. 4. Do not allow pets to sleep in your bed with you or touch your surgical wound. 5. Do not smoke - smoking delays wound healing. This may be a good time to stop smoking. 6. If you have diabetes, it is important for you to manage your blood sugar levels properly before your surgery as well as after your surgery. Poorly managed blood sugar levels slow down wound healing and prevent you from healing completely.     Prevention of Infection  Testing for Staphylococcus aureus on your skin before surgery    Staphylococcus aureus (staph) is a common bacteria that is found on the body. It normally does not cause infection on healthy skin. Before surgery, you will be tested to see if you have staph by swabbing the inside of your nose. When you have an incision with surgery, the goal is to protect that incision from infection. Removal of the staph bacteria before surgery can decrease the risk of a surgical site infection. If your nose swab is positive for staph you will be called. Your treatment will include 2 steps:   Prescription for Mupirocin ointment to be used in each nostril twice a day for 5 days.  Showering with Chlorhexidine (CHG) liquid soap for 5 days prior to surgery. How to use Mupirocin ointment in your nose  1.  the prescription from your pharmacy. You will receive a large tube of ointment which will be big enough for all of your treatments. You will apply this ointment to each nostril 2 times a day for 5 days. 2. Wash your hands with  gel or soap and water for 20 seconds before using ointment. 3. Place a pea-sized amount of ointment on a cotton Q-tip. 4. Apply ointment just inside of each nostril with the Q-tip. Do not push Q-tip or ointment deep inside you nose. 5. Press your nostrils together and massage for a few seconds. 6. Wash your hands with  gel or soap and water after you are finished. 7. Do not get ointment near your eyes. If it gets into your eyes, rinse them with cool water. 8. If you need to use nasal spray, clean the tip of the bottle with alcohol before use and do not use both at the same time. 9. If you are scheduled for COVID testing during the 5 days, do NOT apply morning dose until after the COVID test has been performed. How to use Chlorhexidine (CHG) 4% liquid soap  1. Purchase an 8 ounce bottle of CHG liquid soap (Chlorhexidine 4%, Hibiclens, Hex-A-Clens or store brand) at a pharmacy or grocery store.   2. Wash your hair with your normal shampoo and your body with regular soap and rinse well to remove shampoo and soap from your skin. 3. Wet a clean washcloth and turn off the shower. 4. Put CHG soap on washcloth and apply to your entire body from the neck down. Do not use on your head, face or private parts(genitals). Do not use CHG soap on open sores, wounds or areas of skin irritation. 5. Wash your body gently for 5 minutes. Do not wash your skin too hard. This soap does not create lather. Pay special attention to your underarms and from your belly button to your feet. 6. Turn the shower back on and rinse well to get CHG soap off your body. 7. Pat your skin dry with a clean, dry towel. Do not apply lotions or moisturizer. 8. Put on clean clothes and sleep on fresh bed sheets the night before surgery. Do not allow pets to sleep with you. Eating and Drinking Before Surgery     You may eat a regular dinner at the usual time on the day before your surgery.  Do NOT eat any solid foods after midnight unless your arrival time at the hospital is 3pm or later.  You may drink clear liquids only from 12 midnight until 1 hours prior to your arrival time at the hospital on the day of your surgery. Do NOT drink alcohol.    Clear liquids include:  o Water  o Fruit juices without pulp( i.e. apple juice)  o Carbonated beverages  o Black coffee (no cream/milk)  o Tea (no cream/milk)  o Gatorade   You may drink up to 12-16 ounces at one time every 4 hours between the hours of midnight and 1 hour before your arrival time at the hospital. Example- if your arrival time at the hospital is 6am, you may drink 12-16 ounces of clear liquids no later than 5am.   If your arrival time at the hospital is 3pm or later, you may eat a light breakfast before 8am.   A light breakfast includes:  o Toast or bagel (no butter)  o Black coffee (no cream/milk)  o Tea (no cream/milk)  o Fruit juices without pulp ( i.e. apple juice)  o Do NOT eat meat, eggs, vegetables or fruit   If you have any questions, please contact your surgeon's office. Patient Information Regarding COVID Restrictions    Day of Procedure     Please park in the parking deck or any designated visitor parking lot.  Enter the facility through the [employer] [facility].   On the day of surgery, please provide the cell phone number of the person who will be waiting for you to the Patient Access representative at the time of registration.  Please wear a mask on the day of your procedure.  We are now allowing two designated visitors per stay. Pediatric patients may have 2 designated visitors. These two people may come in with you on the day of your procedure.  The designated visitor must also wear a mask.  Once your procedure and the immediate recovery period is completed, a nurse in the recovery area will contact your designated visitor to inform them of your room number or to otherwise review other pertinent information regarding your care.  Social distancing practices are to be adhered to in waiting areas and the cafeteria. The patient was contacted in person. She verbalized understanding of all instructions does not  need reinforcement.

## 2022-06-30 LAB
BACTERIA SPEC CULT: NORMAL
BACTERIA SPEC CULT: NORMAL
SERVICE CMNT-IMP: NORMAL

## 2022-06-30 NOTE — PERIOP NOTES
PAT Nurse Practitioner   Pre-Operative Chart Review/Assessment:-ORTHOPEDIC                Patient Name:  Warren Toscano                                                           Age:   66 y.o.    :  1950     Today's Date:  2022     Date of PAT:   2022      Date of Surgery:    2022      Procedure(s):  Left Total Knee Arthroplasty     Surgeon:   Dr. Bridger Beck                       PLAN:      1)  Medical Clearance:  Dr. Angelika Zambrano      2)  Cardiac Clearance:  Pt followed by Dr. Ana Walker). LOV 22. Condition stable, no changes made. PAT EKG: normal sinus rhythm. 3)  Diabetic Treatment Consult:  Not indicated. A1c-5.7      4)  Sleep Apnea evaluation:   +GRACIE dx. Pt uses CPAP. 5) Treatment for MRSA/Staph Aureus:  Neg      6) Additional Concerns:  Former smoker, HTN, RA, anxiety, COVID(22)              Vital Signs:         Vitals:    22 1000   BP: 129/66   Pulse: 72   Resp: 12   Temp: 98.6 °F (37 °C)   SpO2: 98%   Weight: 75.6 kg (166 lb 10.7 oz)   Height: 5' 4\" (1.626 m)            ____________________________________________  PAST MEDICAL HISTORY  Past Medical History:   Diagnosis Date    Cancer ([facility] [address]     [facility] - MULTIPLE    COVID-19 2022    Hypertension     Psychiatric disorder     ANXIETY    [facility] arteritis ([facility]     Sleep apnea     CPAP      ____________________________________________  PAST SURGICAL HISTORY  Past Surgical History:   Procedure Laterality Date    HX APPENDECTOMY  1963    HX CATARACT REMOVAL Right     HX COLONOSCOPY      HX ENDOSCOPY      HX HEART CATHETERIZATION      NO STENT      ____________________________________________  HOME MEDICATIONS  Current Outpatient Medications   Medication Sig    cholecalciferol, vitamin D3, (Vitamin D3) 50 mcg (2,000 unit) tab Take 1 Tablet by mouth daily.  acetaminophen (Tylenol Arthritis Pain) 650 mg TbER Take 1,300 mg by mouth every eight (8) hours.     isosorbide dinitrate (ISORDIL) 5 mg tablet Take 30 mg by mouth daily.  B infantis/B ani/B katlin/B bifid (PROBIOTIC 4X PO) Take 1 Tablet by mouth daily.  multivitamin (ONE A DAY) tablet Take 1 Tablet by mouth daily.  TURMERIC PO Take 1 Tablet by mouth daily.  glucosamine sulfate (GLUCOSAMINE PO) Take 1 Tablet by mouth daily. 500MG    CRANBERRY PO Take 1 Tablet by mouth daily.  losartan-hydroCHLOROthiazide (HYZAAR) 100-12.5 mg per tablet Take 1 Tablet by mouth nightly.  omeprazole (PRILOSEC) 40 mg capsule Take  by mouth daily.  mirtazapine (REMERON) 15 mg tablet Take 15 mg by mouth nightly.  hydroxychloroquine (PLAQUENIL) 200 mg tablet Take 200 mg by mouth daily. Indications: rheumatoid arthritis    rosuvastatin (CRESTOR) 5 mg tablet Take 5 mg by mouth nightly.  LORazepam (ATIVAN) 0.5 mg tablet Take 0.5 mg by mouth every eight (8) hours as needed.  Biotin 2,500 mcg cap Take 5,000 mcg by mouth daily.  omega 3-dha-epa-fish oil (Fish Oil) 100-160-1,000 mg cap Take 1 Cap by mouth daily.  Calcium-Cholecalciferol, D3, (Calcium 600 with Vitamin D3) 600 mg(1,500mg) -400 unit chew Take 1 Tab by mouth.  aspirin 81 mg chewable tablet Take 81 mg by mouth daily.  ascorbic acid, vitamin C, (Vitamin C) 500 mg tablet Take 500 mg by mouth daily.      No current facility-administered medications for this encounter.      ____________________________________________  ALLERGIES  Allergies   Allergen Reactions    Ceclor [Cefaclor] Rash    Keflex [Cephalexin] Rash    Mobic [Meloxicam] Hives    Penicillamine Rash    Penicillins Rash     NO REPORTED SEVERE NON-IGE-MEDICATED REACTIONS      ____________________________________________  SOCIAL HISTORY  Social History     Tobacco Use    Smoking status: Former Smoker     Packs/day: 0.50     Years: 5.00     Pack years: 2.50     Quit date: 1973     Years since quittin.0    Smokeless tobacco: Never Used   Substance Use Topics    Alcohol use: Not Currently ____________________________________________   Internal Administration   First Dose      Second Dose          Labs:     Hospital Outpatient Visit on 06/29/2022   Component Date Value Ref Range Status    Sodium 06/29/2022 140  136 - 145 mmol/L Final    Potassium 06/29/2022 4.2  3.5 - 5.1 mmol/L Final    Chloride 06/29/2022 104  97 - 108 mmol/L Final    CO2 06/29/2022 32  21 - 32 mmol/L Final    Anion gap 06/29/2022 4* 5 - 15 mmol/L Final    Glucose 06/29/2022 116* 65 - 100 mg/dL Final    BUN 06/29/2022 21* 6 - 20 MG/DL Final    Creatinine 06/29/2022 0.97  0.55 - 1.02 MG/DL Final    BUN/Creatinine ratio 06/29/2022 22* 12 - 20   Final    GFR est AA 06/29/2022 >60  >60 ml/min/1.73m2 Final    GFR est non-AA 06/29/2022 56* >60 ml/min/1.73m2 Final    Estimated GFR is calculated using the IDMS-traceable Modification of Diet in Renal Disease (MDRD) Study equation, reported for both  Americans (GFRAA) and non- Americans (GFRNA), and normalized to 1.73m2 body surface area. The physician must decide which value applies to the patient.     Calcium 06/29/2022 9.9  8.5 - 10.1 MG/DL Final    WBC 06/29/2022 6.6  3.6 - 11.0 K/uL Final    RBC 06/29/2022 3.98  3.80 - 5.20 M/uL Final    HGB 06/29/2022 12.9  11.5 - 16.0 g/dL Final    HCT 06/29/2022 39.2  35.0 - 47.0 % Final    MCV 06/29/2022 98.5  80.0 - 99.0 FL Final    MCH 06/29/2022 32.4  26.0 - 34.0 PG Final    MCHC 06/29/2022 32.9  30.0 - 36.5 g/dL Final    RDW 06/29/2022 13.4  11.5 - 14.5 % Final    PLATELET 00/07/2684 266  150 - 400 K/uL Final    MPV 06/29/2022 11.5  8.9 - 12.9 FL Final    NRBC 06/29/2022 0.0  0  WBC Final    ABSOLUTE NRBC 06/29/2022 0.00  0.00 - 0.01 K/uL Final    Crossmatch Expiration 06/29/2022 07/09/2022,2359   Final    ABO/Rh(D) 06/29/2022 A POSITIVE   Final    Antibody screen 06/29/2022 NEG   Final    INR 06/29/2022 1.0  0.9 - 1.1   Final    A single therapeutic range for Vit K antagonists may not be optimal for all indications - see June, 2008 issue of Chest, American College of Chest Physicians Evidence-Based Clinical Practice Guidelines, 8th Edition.  Prothrombin time 06/29/2022 10.8  9.0 - 11.1 sec Final    Color 06/29/2022 YELLOW/STRAW    Final    Color Reference Range: Straw, Yellow or Dark Yellow    Appearance 06/29/2022 CLEAR  CLEAR   Final    Specific gravity 06/29/2022 1.018  1.003 - 1.030   Final    pH (UA) 06/29/2022 5.5  5.0 - 8.0   Final    Protein 06/29/2022 Negative  NEG mg/dL Final    Glucose 06/29/2022 Negative  NEG mg/dL Final    Ketone 06/29/2022 Negative  NEG mg/dL Final    Bilirubin 06/29/2022 Negative  NEG   Final    Blood 06/29/2022 Negative  NEG   Final    Urobilinogen 06/29/2022 0.2  0.2 - 1.0 EU/dL Final    Nitrites 06/29/2022 Negative  NEG   Final    Leukocyte Esterase 06/29/2022 Negative  NEG   Final    UA:UC IF INDICATED 06/29/2022 CULTURE NOT INDICATED BY UA RESULT  CNI   Final    WBC 06/29/2022 0-4  0 - 4 /hpf Final    RBC 06/29/2022 0-5  0 - 5 /hpf Final    Epithelial cells 06/29/2022 FEW  FEW /lpf Final    Epithelial cell category consists of squamous cells and /or transitional urothelial cells. Renal tubular cells, if present, are separately identified as such.     Bacteria 06/29/2022 Negative  NEG /hpf Final    Hyaline cast 06/29/2022 0-2  0 - 5 /lpf Final    Ventricular Rate 06/29/2022 63  BPM Final    Atrial Rate 06/29/2022 63  BPM Final    P-R Interval 06/29/2022 192  ms Final    QRS Duration 06/29/2022 80  ms Final    Q-T Interval 06/29/2022 420  ms Final    QTC Calculation (Bezet) 06/29/2022 429  ms Final    Calculated P Axis 06/29/2022 27  degrees Final    Calculated R Axis 06/29/2022 -18  degrees Final    Calculated T Axis 06/29/2022 8  degrees Final    Diagnosis 06/29/2022    Final                    Value:Normal sinus rhythm  Cannot rule out Anterior infarct , age undetermined  Abnormal ECG  No previous tracings available for comparison    Confirmed by To Redding MD, Earl Aly ([id number]) on 6/29/2022 12:31:46 PM      Hemoglobin A1c 06/29/2022 5.7* 4.0 - 5.6 % Final    Comment: NEW METHOD  PLEASE NOTE NEW REFERENCE RANGE  (NOTE)  HbA1C Interpretive Ranges  <5.7              Normal  5.7 - 6.4         Consider Prediabetes  >6.5              Consider Diabetes      Est. average glucose 06/29/2022 117  mg/dL Final    Special Requests: 06/29/2022 NO SPECIAL REQUESTS    Final    Culture result: 06/29/2022 MRSA NOT PRESENT    Final       Skin:     Denies open wounds, cuts, sores, rashes or other areas of concern in PAT assessment.           Hazel Lamas NP

## 2022-07-07 NOTE — PERIOP NOTES
CALLED DR. COFFMAN OFFICE (CARDIO) AT [place] [id number] 6/30/22. OFFICE TO FAX THOSE RESULTS TO KRISTINA TODAY.

## 2022-07-12 ENCOUNTER — ANESTHESIA EVENT (OUTPATIENT)
Dept: SURGERY | Age: 72
End: 2022-07-12
Payer: MEDICARE

## 2022-07-13 ENCOUNTER — HOSPITAL ENCOUNTER (OUTPATIENT)
Age: 72
Discharge: HOME HEALTH | End: 2022-07-14
Attending: ORTHOPAEDIC SURGERY | Admitting: ORTHOPAEDIC SURGERY
Payer: MEDICARE

## 2022-07-13 ENCOUNTER — ANESTHESIA (OUTPATIENT)
Dept: SURGERY | Age: 72
End: 2022-07-13
Payer: MEDICARE

## 2022-07-13 DIAGNOSIS — Z96.652 S/P TOTAL KNEE REPLACEMENT, LEFT: Primary | ICD-10-CM

## 2022-07-13 PROBLEM — M19.91 PRIMARY OSTEOARTHRITIS: Status: ACTIVE | Noted: 2022-07-13

## 2022-07-13 PROBLEM — M17.12 OSTEOARTHRITIS OF LEFT KNEE: Status: ACTIVE | Noted: 2022-07-13

## 2022-07-13 LAB
ABO + RH BLD: NORMAL
BLOOD GROUP ANTIBODIES SERPL: NORMAL
GLUCOSE BLD STRIP.AUTO-MCNC: 105 MG/DL (ref 65–117)
SERVICE CMNT-IMP: NORMAL
SPECIMEN EXP DATE BLD: NORMAL

## 2022-07-13 PROCEDURE — G0378 HOSPITAL OBSERVATION PER HR: HCPCS

## 2022-07-13 PROCEDURE — C1776 JOINT DEVICE (IMPLANTABLE): HCPCS | Performed by: ORTHOPAEDIC SURGERY

## 2022-07-13 PROCEDURE — 74011250636 HC RX REV CODE- 250/636: Performed by: NURSE ANESTHETIST, CERTIFIED REGISTERED

## 2022-07-13 PROCEDURE — 76010000876 HC OR TIME 2 TO 2.5HR INTENSV - TIER 2: Performed by: ORTHOPAEDIC SURGERY

## 2022-07-13 PROCEDURE — 27447 TOTAL KNEE ARTHROPLASTY: CPT | Performed by: PHYSICIAN ASSISTANT

## 2022-07-13 PROCEDURE — 77030042556 HC PNCL CAUT -B: Performed by: ORTHOPAEDIC SURGERY

## 2022-07-13 PROCEDURE — 77030008463 HC STPLR SKN PROX J&J -B: Performed by: ORTHOPAEDIC SURGERY

## 2022-07-13 PROCEDURE — 97116 GAIT TRAINING THERAPY: CPT

## 2022-07-13 PROCEDURE — 97530 THERAPEUTIC ACTIVITIES: CPT

## 2022-07-13 PROCEDURE — 27447 TOTAL KNEE ARTHROPLASTY: CPT | Performed by: ORTHOPAEDIC SURGERY

## 2022-07-13 PROCEDURE — 74011250637 HC RX REV CODE- 250/637: Performed by: PHYSICIAN ASSISTANT

## 2022-07-13 PROCEDURE — 74011250636 HC RX REV CODE- 250/636: Performed by: PHYSICIAN ASSISTANT

## 2022-07-13 PROCEDURE — 77030010783 HC BOWL MX BN CEM J&J -B: Performed by: ORTHOPAEDIC SURGERY

## 2022-07-13 PROCEDURE — 77030010507 HC ADH SKN DERMBND J&J -B: Performed by: ORTHOPAEDIC SURGERY

## 2022-07-13 PROCEDURE — 77030041690 HC SYS PINNING KN JNJ -D: Performed by: ORTHOPAEDIC SURGERY

## 2022-07-13 PROCEDURE — 20985 CPTR-ASST DIR MS PX: CPT | Performed by: ORTHOPAEDIC SURGERY

## 2022-07-13 PROCEDURE — C9290 INJ, BUPIVACAINE LIPOSOME: HCPCS | Performed by: ORTHOPAEDIC SURGERY

## 2022-07-13 PROCEDURE — 74011250636 HC RX REV CODE- 250/636: Performed by: ANESTHESIOLOGY

## 2022-07-13 PROCEDURE — 76210000016 HC OR PH I REC 1 TO 1.5 HR: Performed by: ORTHOPAEDIC SURGERY

## 2022-07-13 PROCEDURE — 77030006822 HC BLD SAW SAG BRSM -B: Performed by: ORTHOPAEDIC SURGERY

## 2022-07-13 PROCEDURE — 77030020274 HC MISC IMPL ORTHOPEDIC: Performed by: ORTHOPAEDIC SURGERY

## 2022-07-13 PROCEDURE — 77030002933 HC SUT MCRYL J&J -A: Performed by: ORTHOPAEDIC SURGERY

## 2022-07-13 PROCEDURE — 36415 COLL VENOUS BLD VENIPUNCTURE: CPT

## 2022-07-13 PROCEDURE — 74011250637 HC RX REV CODE- 250/637: Performed by: ANESTHESIOLOGY

## 2022-07-13 PROCEDURE — 74011000258 HC RX REV CODE- 258: Performed by: ORTHOPAEDIC SURGERY

## 2022-07-13 PROCEDURE — 74011250636 HC RX REV CODE- 250/636: Performed by: ORTHOPAEDIC SURGERY

## 2022-07-13 PROCEDURE — 77030031139 HC SUT VCRL2 J&J -A: Performed by: ORTHOPAEDIC SURGERY

## 2022-07-13 PROCEDURE — 77030000032 HC CUF TRNQT ZIMM -B: Performed by: ORTHOPAEDIC SURGERY

## 2022-07-13 PROCEDURE — 77030005513 HC CATH URETH FOL11 MDII -B: Performed by: ORTHOPAEDIC SURGERY

## 2022-07-13 PROCEDURE — 2709999900 HC NON-CHARGEABLE SUPPLY: Performed by: ORTHOPAEDIC SURGERY

## 2022-07-13 PROCEDURE — 74011000250 HC RX REV CODE- 250: Performed by: PHYSICIAN ASSISTANT

## 2022-07-13 PROCEDURE — 82962 GLUCOSE BLOOD TEST: CPT

## 2022-07-13 PROCEDURE — 76060000035 HC ANESTHESIA 2 TO 2.5 HR: Performed by: ORTHOPAEDIC SURGERY

## 2022-07-13 PROCEDURE — 77030020275 HC MISC ORTHOPEDIC: Performed by: ORTHOPAEDIC SURGERY

## 2022-07-13 PROCEDURE — 86900 BLOOD TYPING SEROLOGIC ABO: CPT

## 2022-07-13 PROCEDURE — 74011000250 HC RX REV CODE- 250: Performed by: ORTHOPAEDIC SURGERY

## 2022-07-13 PROCEDURE — 97161 PT EVAL LOW COMPLEX 20 MIN: CPT

## 2022-07-13 PROCEDURE — 77030035236 HC SUT PDS STRATFX BARB J&J -B: Performed by: ORTHOPAEDIC SURGERY

## 2022-07-13 PROCEDURE — 77030006835 HC BLD SAW SAG STRY -B: Performed by: ORTHOPAEDIC SURGERY

## 2022-07-13 PROCEDURE — 74011000250 HC RX REV CODE- 250: Performed by: NURSE ANESTHETIST, CERTIFIED REGISTERED

## 2022-07-13 PROCEDURE — C1713 ANCHOR/SCREW BN/BN,TIS/BN: HCPCS | Performed by: ORTHOPAEDIC SURGERY

## 2022-07-13 DEVICE — ATTUNE PATELLA MEDIALIZED DOME 35MM CEMENTED AOX
Type: IMPLANTABLE DEVICE | Site: KNEE | Status: FUNCTIONAL
Brand: ATTUNE

## 2022-07-13 DEVICE — KNEE K1 TOT HEMI STD CEM IMPL CAPPED SYNTHES: Type: IMPLANTABLE DEVICE | Site: KNEE | Status: FUNCTIONAL

## 2022-07-13 DEVICE — ATTUNE KNEE SYSTEM TIBIAL INSERT FIXED BEARING MEDIAL STABILIZED LEFT AOX 5, 7MM
Type: IMPLANTABLE DEVICE | Site: KNEE | Status: FUNCTIONAL
Brand: ATTUNE

## 2022-07-13 DEVICE — ATTUNE KNEE SYSTEM FEMORAL CRUCIATE RETAINING NARROW SIZE 5N LEFT CEMENTED
Type: IMPLANTABLE DEVICE | Site: KNEE | Status: FUNCTIONAL
Brand: ATTUNE

## 2022-07-13 DEVICE — ATTUNE KNEE SYSTEM TIBIAL BASE FIXED BEARING SIZE 5 CEMENTED
Type: IMPLANTABLE DEVICE | Site: KNEE | Status: FUNCTIONAL
Brand: ATTUNE

## 2022-07-13 DEVICE — SMARTSET GHV GENTAMICIN HIGH VISCOSITY BONE CEMENT 40G
Type: IMPLANTABLE DEVICE | Site: KNEE | Status: FUNCTIONAL
Brand: SMARTSET

## 2022-07-13 RX ORDER — SODIUM CHLORIDE 0.9 % (FLUSH) 0.9 %
5-40 SYRINGE (ML) INJECTION EVERY 8 HOURS
Status: DISCONTINUED | OUTPATIENT
Start: 2022-07-13 | End: 2022-07-14 | Stop reason: HOSPADM

## 2022-07-13 RX ORDER — SODIUM CHLORIDE, SODIUM LACTATE, POTASSIUM CHLORIDE, CALCIUM CHLORIDE 600; 310; 30; 20 MG/100ML; MG/100ML; MG/100ML; MG/100ML
INJECTION, SOLUTION INTRAVENOUS
Status: DISCONTINUED | OUTPATIENT
Start: 2022-07-13 | End: 2022-07-13 | Stop reason: HOSPADM

## 2022-07-13 RX ORDER — HYDROCHLOROTHIAZIDE 25 MG/1
12.5 TABLET ORAL
Status: DISCONTINUED | OUTPATIENT
Start: 2022-07-13 | End: 2022-07-14 | Stop reason: HOSPADM

## 2022-07-13 RX ORDER — ROPIVACAINE HYDROCHLORIDE 5 MG/ML
INJECTION, SOLUTION EPIDURAL; INFILTRATION; PERINEURAL
Status: COMPLETED | OUTPATIENT
Start: 2022-07-13 | End: 2022-07-13

## 2022-07-13 RX ORDER — ALBUTEROL SULFATE 0.83 MG/ML
2.5 SOLUTION RESPIRATORY (INHALATION) AS NEEDED
Status: DISCONTINUED | OUTPATIENT
Start: 2022-07-13 | End: 2022-07-13 | Stop reason: HOSPADM

## 2022-07-13 RX ORDER — FENTANYL CITRATE 50 UG/ML
25 INJECTION, SOLUTION INTRAMUSCULAR; INTRAVENOUS
Status: DISCONTINUED | OUTPATIENT
Start: 2022-07-13 | End: 2022-07-13 | Stop reason: HOSPADM

## 2022-07-13 RX ORDER — POLYETHYLENE GLYCOL 3350 17 G/17G
17 POWDER, FOR SOLUTION ORAL DAILY
Status: DISCONTINUED | OUTPATIENT
Start: 2022-07-14 | End: 2022-07-14 | Stop reason: HOSPADM

## 2022-07-13 RX ORDER — HYDROMORPHONE HYDROCHLORIDE 1 MG/ML
0.5 INJECTION, SOLUTION INTRAMUSCULAR; INTRAVENOUS; SUBCUTANEOUS
Status: DISCONTINUED | OUTPATIENT
Start: 2022-07-13 | End: 2022-07-14 | Stop reason: HOSPADM

## 2022-07-13 RX ORDER — GLYCOPYRROLATE 0.2 MG/ML
0.2 INJECTION INTRAMUSCULAR; INTRAVENOUS
Status: DISCONTINUED | OUTPATIENT
Start: 2022-07-13 | End: 2022-07-13 | Stop reason: HOSPADM

## 2022-07-13 RX ORDER — ONDANSETRON 2 MG/ML
4 INJECTION INTRAMUSCULAR; INTRAVENOUS
Status: DISCONTINUED | OUTPATIENT
Start: 2022-07-13 | End: 2022-07-14 | Stop reason: HOSPADM

## 2022-07-13 RX ORDER — ASPIRIN 81 MG/1
81 TABLET ORAL 2 TIMES DAILY
Status: DISCONTINUED | OUTPATIENT
Start: 2022-07-13 | End: 2022-07-14 | Stop reason: HOSPADM

## 2022-07-13 RX ORDER — SODIUM CHLORIDE 9 MG/ML
25 INJECTION, SOLUTION INTRAVENOUS CONTINUOUS
Status: DISCONTINUED | OUTPATIENT
Start: 2022-07-13 | End: 2022-07-13 | Stop reason: HOSPADM

## 2022-07-13 RX ORDER — ROSUVASTATIN CALCIUM 10 MG/1
5 TABLET, COATED ORAL
Status: DISCONTINUED | OUTPATIENT
Start: 2022-07-13 | End: 2022-07-14 | Stop reason: HOSPADM

## 2022-07-13 RX ORDER — MIRTAZAPINE 15 MG/1
15 TABLET, FILM COATED ORAL
Status: DISCONTINUED | OUTPATIENT
Start: 2022-07-13 | End: 2022-07-14 | Stop reason: HOSPADM

## 2022-07-13 RX ORDER — ONDANSETRON 2 MG/ML
4 INJECTION INTRAMUSCULAR; INTRAVENOUS AS NEEDED
Status: DISCONTINUED | OUTPATIENT
Start: 2022-07-13 | End: 2022-07-13 | Stop reason: HOSPADM

## 2022-07-13 RX ORDER — LIDOCAINE HYDROCHLORIDE 20 MG/ML
INJECTION, SOLUTION INFILTRATION; PERINEURAL AS NEEDED
Status: DISCONTINUED | OUTPATIENT
Start: 2022-07-13 | End: 2022-07-13 | Stop reason: HOSPADM

## 2022-07-13 RX ORDER — MIDAZOLAM HYDROCHLORIDE 1 MG/ML
1 INJECTION, SOLUTION INTRAMUSCULAR; INTRAVENOUS AS NEEDED
Status: DISCONTINUED | OUTPATIENT
Start: 2022-07-13 | End: 2022-07-13 | Stop reason: HOSPADM

## 2022-07-13 RX ORDER — LIDOCAINE HYDROCHLORIDE 10 MG/ML
0.1 INJECTION, SOLUTION EPIDURAL; INFILTRATION; INTRACAUDAL; PERINEURAL AS NEEDED
Status: DISCONTINUED | OUTPATIENT
Start: 2022-07-13 | End: 2022-07-13 | Stop reason: HOSPADM

## 2022-07-13 RX ORDER — MORPHINE SULFATE 2 MG/ML
2 INJECTION, SOLUTION INTRAMUSCULAR; INTRAVENOUS
Status: DISCONTINUED | OUTPATIENT
Start: 2022-07-13 | End: 2022-07-13 | Stop reason: HOSPADM

## 2022-07-13 RX ORDER — FENTANYL CITRATE 50 UG/ML
50 INJECTION, SOLUTION INTRAMUSCULAR; INTRAVENOUS AS NEEDED
Status: DISCONTINUED | OUTPATIENT
Start: 2022-07-13 | End: 2022-07-13 | Stop reason: HOSPADM

## 2022-07-13 RX ORDER — DIPHENHYDRAMINE HYDROCHLORIDE 50 MG/ML
12.5 INJECTION, SOLUTION INTRAMUSCULAR; INTRAVENOUS AS NEEDED
Status: DISCONTINUED | OUTPATIENT
Start: 2022-07-13 | End: 2022-07-13 | Stop reason: HOSPADM

## 2022-07-13 RX ORDER — SODIUM CHLORIDE 0.9 % (FLUSH) 0.9 %
5-40 SYRINGE (ML) INJECTION AS NEEDED
Status: DISCONTINUED | OUTPATIENT
Start: 2022-07-13 | End: 2022-07-14 | Stop reason: HOSPADM

## 2022-07-13 RX ORDER — ISOSORBIDE DINITRATE 10 MG/1
30 TABLET ORAL DAILY
Status: DISCONTINUED | OUTPATIENT
Start: 2022-07-14 | End: 2022-07-14 | Stop reason: HOSPADM

## 2022-07-13 RX ORDER — TRAMADOL HYDROCHLORIDE 50 MG/1
50 TABLET ORAL
Status: DISCONTINUED | OUTPATIENT
Start: 2022-07-13 | End: 2022-07-14 | Stop reason: HOSPADM

## 2022-07-13 RX ORDER — ACETAMINOPHEN 325 MG/1
650 TABLET ORAL ONCE
Status: DISCONTINUED | OUTPATIENT
Start: 2022-07-13 | End: 2022-07-13 | Stop reason: HOSPADM

## 2022-07-13 RX ORDER — MIDAZOLAM HYDROCHLORIDE 1 MG/ML
INJECTION, SOLUTION INTRAMUSCULAR; INTRAVENOUS AS NEEDED
Status: DISCONTINUED | OUTPATIENT
Start: 2022-07-13 | End: 2022-07-13 | Stop reason: HOSPADM

## 2022-07-13 RX ORDER — PROPOFOL 10 MG/ML
INJECTION, EMULSION INTRAVENOUS AS NEEDED
Status: DISCONTINUED | OUTPATIENT
Start: 2022-07-13 | End: 2022-07-13 | Stop reason: HOSPADM

## 2022-07-13 RX ORDER — LOSARTAN POTASSIUM 50 MG/1
100 TABLET ORAL
Status: DISCONTINUED | OUTPATIENT
Start: 2022-07-13 | End: 2022-07-14 | Stop reason: HOSPADM

## 2022-07-13 RX ORDER — NALOXONE HYDROCHLORIDE 0.4 MG/ML
0.4 INJECTION, SOLUTION INTRAMUSCULAR; INTRAVENOUS; SUBCUTANEOUS AS NEEDED
Status: DISCONTINUED | OUTPATIENT
Start: 2022-07-13 | End: 2022-07-14 | Stop reason: HOSPADM

## 2022-07-13 RX ORDER — SODIUM CHLORIDE, SODIUM LACTATE, POTASSIUM CHLORIDE, CALCIUM CHLORIDE 600; 310; 30; 20 MG/100ML; MG/100ML; MG/100ML; MG/100ML
1000 INJECTION, SOLUTION INTRAVENOUS CONTINUOUS
Status: DISCONTINUED | OUTPATIENT
Start: 2022-07-13 | End: 2022-07-13 | Stop reason: HOSPADM

## 2022-07-13 RX ORDER — PREGABALIN 75 MG/1
75 CAPSULE ORAL ONCE
Status: COMPLETED | OUTPATIENT
Start: 2022-07-13 | End: 2022-07-13

## 2022-07-13 RX ORDER — FENTANYL CITRATE 50 UG/ML
INJECTION, SOLUTION INTRAMUSCULAR; INTRAVENOUS AS NEEDED
Status: DISCONTINUED | OUTPATIENT
Start: 2022-07-13 | End: 2022-07-13 | Stop reason: HOSPADM

## 2022-07-13 RX ORDER — FAMOTIDINE 20 MG/1
20 TABLET, FILM COATED ORAL 2 TIMES DAILY
Status: DISCONTINUED | OUTPATIENT
Start: 2022-07-13 | End: 2022-07-14

## 2022-07-13 RX ORDER — FACIAL-BODY WIPES
10 EACH TOPICAL DAILY PRN
Status: DISCONTINUED | OUTPATIENT
Start: 2022-07-13 | End: 2022-07-14 | Stop reason: HOSPADM

## 2022-07-13 RX ORDER — AMOXICILLIN 250 MG
1 CAPSULE ORAL 2 TIMES DAILY
Status: DISCONTINUED | OUTPATIENT
Start: 2022-07-13 | End: 2022-07-14 | Stop reason: HOSPADM

## 2022-07-13 RX ORDER — KETOROLAC TROMETHAMINE 30 MG/ML
15 INJECTION, SOLUTION INTRAMUSCULAR; INTRAVENOUS EVERY 6 HOURS
Status: COMPLETED | OUTPATIENT
Start: 2022-07-13 | End: 2022-07-14

## 2022-07-13 RX ORDER — PROPOFOL 10 MG/ML
INJECTION, EMULSION INTRAVENOUS
Status: DISCONTINUED | OUTPATIENT
Start: 2022-07-13 | End: 2022-07-13 | Stop reason: HOSPADM

## 2022-07-13 RX ORDER — ACETAMINOPHEN 325 MG/1
650 TABLET ORAL EVERY 6 HOURS
Status: DISCONTINUED | OUTPATIENT
Start: 2022-07-13 | End: 2022-07-14 | Stop reason: HOSPADM

## 2022-07-13 RX ORDER — HYDROXYZINE HYDROCHLORIDE 10 MG/1
10 TABLET, FILM COATED ORAL
Status: DISCONTINUED | OUTPATIENT
Start: 2022-07-13 | End: 2022-07-14 | Stop reason: HOSPADM

## 2022-07-13 RX ORDER — CEFAZOLIN SODIUM 1 G/3ML
INJECTION, POWDER, FOR SOLUTION INTRAMUSCULAR; INTRAVENOUS AS NEEDED
Status: DISCONTINUED | OUTPATIENT
Start: 2022-07-13 | End: 2022-07-13 | Stop reason: HOSPADM

## 2022-07-13 RX ORDER — ROPIVACAINE HYDROCHLORIDE 5 MG/ML
30 INJECTION, SOLUTION EPIDURAL; INFILTRATION; PERINEURAL AS NEEDED
Status: DISCONTINUED | OUTPATIENT
Start: 2022-07-13 | End: 2022-07-13 | Stop reason: HOSPADM

## 2022-07-13 RX ORDER — MIDAZOLAM HYDROCHLORIDE 1 MG/ML
0.5 INJECTION, SOLUTION INTRAMUSCULAR; INTRAVENOUS
Status: DISCONTINUED | OUTPATIENT
Start: 2022-07-13 | End: 2022-07-13 | Stop reason: HOSPADM

## 2022-07-13 RX ORDER — SODIUM CHLORIDE 9 MG/ML
125 INJECTION, SOLUTION INTRAVENOUS CONTINUOUS
Status: DISPENSED | OUTPATIENT
Start: 2022-07-13 | End: 2022-07-14

## 2022-07-13 RX ORDER — ACETAMINOPHEN 500 MG
1000 TABLET ORAL ONCE
Status: COMPLETED | OUTPATIENT
Start: 2022-07-13 | End: 2022-07-13

## 2022-07-13 RX ORDER — HYDROMORPHONE HYDROCHLORIDE 1 MG/ML
0.2 INJECTION, SOLUTION INTRAMUSCULAR; INTRAVENOUS; SUBCUTANEOUS
Status: DISCONTINUED | OUTPATIENT
Start: 2022-07-13 | End: 2022-07-13 | Stop reason: HOSPADM

## 2022-07-13 RX ORDER — HYDROCODONE BITARTRATE AND ACETAMINOPHEN 5; 325 MG/1; MG/1
1 TABLET ORAL AS NEEDED
Status: DISCONTINUED | OUTPATIENT
Start: 2022-07-13 | End: 2022-07-13 | Stop reason: HOSPADM

## 2022-07-13 RX ORDER — OXYCODONE HYDROCHLORIDE 5 MG/1
5 TABLET ORAL
Status: DISCONTINUED | OUTPATIENT
Start: 2022-07-13 | End: 2022-07-14 | Stop reason: HOSPADM

## 2022-07-13 RX ADMIN — ACETAMINOPHEN 650 MG: 325 TABLET ORAL at 14:51

## 2022-07-13 RX ADMIN — ACETAMINOPHEN 650 MG: 325 TABLET ORAL at 18:55

## 2022-07-13 RX ADMIN — HYDROCHLOROTHIAZIDE 12.5 MG: 25 TABLET ORAL at 22:28

## 2022-07-13 RX ADMIN — SODIUM CHLORIDE, POTASSIUM CHLORIDE, SODIUM LACTATE AND CALCIUM CHLORIDE 1000 ML: 600; 310; 30; 20 INJECTION, SOLUTION INTRAVENOUS at 07:51

## 2022-07-13 RX ADMIN — SODIUM CHLORIDE 125 ML/HR: 9 INJECTION, SOLUTION INTRAVENOUS at 10:55

## 2022-07-13 RX ADMIN — HYDROCODONE BITARTRATE AND ACETAMINOPHEN 1 TABLET: 5; 325 TABLET ORAL at 11:59

## 2022-07-13 RX ADMIN — LOSARTAN POTASSIUM 100 MG: 50 TABLET, FILM COATED ORAL at 22:27

## 2022-07-13 RX ADMIN — FENTANYL CITRATE 50 MCG: 50 INJECTION, SOLUTION INTRAMUSCULAR; INTRAVENOUS at 08:28

## 2022-07-13 RX ADMIN — ASPIRIN 81 MG: 81 TABLET, COATED ORAL at 18:55

## 2022-07-13 RX ADMIN — KETOROLAC TROMETHAMINE 15 MG: 30 INJECTION, SOLUTION INTRAMUSCULAR; INTRAVENOUS at 14:51

## 2022-07-13 RX ADMIN — SODIUM CHLORIDE 25 ML/HR: 9 INJECTION, SOLUTION INTRAVENOUS at 08:01

## 2022-07-13 RX ADMIN — MIDAZOLAM 1 MG: 1 INJECTION INTRAMUSCULAR; INTRAVENOUS at 08:28

## 2022-07-13 RX ADMIN — ONDANSETRON 4 MG: 2 INJECTION INTRAMUSCULAR; INTRAVENOUS at 11:50

## 2022-07-13 RX ADMIN — DOCUSATE SODIUM 50MG AND SENNOSIDES 8.6MG 1 TABLET: 8.6; 5 TABLET, FILM COATED ORAL at 18:55

## 2022-07-13 RX ADMIN — SODIUM CHLORIDE 125 ML/HR: 9 INJECTION, SOLUTION INTRAVENOUS at 18:55

## 2022-07-13 RX ADMIN — SODIUM CHLORIDE, PRESERVATIVE FREE 10 ML: 5 INJECTION INTRAVENOUS at 14:51

## 2022-07-13 RX ADMIN — PREGABALIN 75 MG: 75 CAPSULE ORAL at 08:02

## 2022-07-13 RX ADMIN — ROSUVASTATIN CALCIUM 5 MG: 10 TABLET, FILM COATED ORAL at 22:00

## 2022-07-13 RX ADMIN — PROPOFOL 20 MG: 10 INJECTION, EMULSION INTRAVENOUS at 08:51

## 2022-07-13 RX ADMIN — ROPIVACAINE HYDROCHLORIDE 30 ML: 5 INJECTION, SOLUTION EPIDURAL; INFILTRATION; PERINEURAL at 08:22

## 2022-07-13 RX ADMIN — ACETAMINOPHEN 1000 MG: 500 TABLET ORAL at 08:02

## 2022-07-13 RX ADMIN — CEFAZOLIN 2 G: 330 INJECTION, POWDER, FOR SOLUTION INTRAMUSCULAR; INTRAVENOUS at 09:01

## 2022-07-13 RX ADMIN — KETOROLAC TROMETHAMINE 15 MG: 30 INJECTION, SOLUTION INTRAMUSCULAR; INTRAVENOUS at 19:00

## 2022-07-13 RX ADMIN — MIRTAZAPINE 15 MG: 15 TABLET, FILM COATED ORAL at 22:27

## 2022-07-13 RX ADMIN — SODIUM CHLORIDE, POTASSIUM CHLORIDE, SODIUM LACTATE AND CALCIUM CHLORIDE: 600; 310; 30; 20 INJECTION, SOLUTION INTRAVENOUS at 09:33

## 2022-07-13 RX ADMIN — PROPOFOL 30 MG: 10 INJECTION, EMULSION INTRAVENOUS at 08:46

## 2022-07-13 RX ADMIN — FENTANYL CITRATE 50 MCG: 0.05 INJECTION, SOLUTION INTRAMUSCULAR; INTRAVENOUS at 08:28

## 2022-07-13 RX ADMIN — SODIUM CHLORIDE, POTASSIUM CHLORIDE, SODIUM LACTATE AND CALCIUM CHLORIDE: 600; 310; 30; 20 INJECTION, SOLUTION INTRAVENOUS at 07:45

## 2022-07-13 RX ADMIN — MEPIVACAINE HYDROCHLORIDE 60 MG: 15 INJECTION, SOLUTION EPIDURAL; INFILTRATION at 09:19

## 2022-07-13 RX ADMIN — PROPOFOL 75 MCG/KG/MIN: 10 INJECTION, EMULSION INTRAVENOUS at 08:50

## 2022-07-13 RX ADMIN — LIDOCAINE HYDROCHLORIDE 80 MG: 20 INJECTION, SOLUTION INFILTRATION; PERINEURAL at 08:46

## 2022-07-13 RX ADMIN — FAMOTIDINE 20 MG: 20 TABLET, FILM COATED ORAL at 18:55

## 2022-07-13 RX ADMIN — WATER 2 G: 1 INJECTION INTRAMUSCULAR; INTRAVENOUS; SUBCUTANEOUS at 16:44

## 2022-07-13 RX ADMIN — VANCOMYCIN HYDROCHLORIDE 1000 MG: 1 INJECTION, POWDER, LYOPHILIZED, FOR SOLUTION INTRAVENOUS at 08:01

## 2022-07-13 NOTE — PROGRESS NOTES
[facility]      Herbal and Nutritional Product Restrictions       The following herbal, alternative, and/or nutritional/dietary supplement product(s) has been discontinued per [employer]/[facility] approved policy:       Glucosamine (medication name/dose/frequency)     Please reorder upon discharge if appropriate.      Thank you, You [email or web address].Ph    7/13/2022 1:25 PM

## 2022-07-13 NOTE — OP NOTES
Name: Jackson Hammonds  MRN:  [id number]  : 1950  Age:  74 y.o. Surgery Date: 2022      OPERATIVE REPORT - LEFT TOTAL KNEE REPLACEMENT    PREOPERATIVE DIAGNOSIS: Osteoarthritis, left knee. POSTOPERATIVE DIAGNOSIS: Osteoarthritis, left knee. PROCEDURE PERFORMED: Computer assisted LEFT total knee arthroplasty [employer] Robot    SURGEON: Claudia Bentley MD    FIRST ASSISTANT:  Yanely Umaña PA-C    ANESTHESIA: Spinal    PRE-OP ANTIBIOTIC: Ancef 2g    COMPLICATIONS: None. ESTIMATED BLOOD LOSS: 100 mL. SPECIMENS REMOVED: None. COMPONENTS IMPLANTED:   Implant Name Type Inv. Item Serial No.  Lot No. LRB No. Used Action   CEMENT BNE 40GM FULL DOSE PMMA W/ GENT HI VISC RADPQ LNG - SN/A  CEMENT BNE 40GM FULL DOSE PMMA W/ GENT HI VISC RADPQ LNG N/A [employer] [facility] [id number] Left 2 Implanted   COMPONENT FEM SZ 5 L KNEE JOSE J CRUCE RET BROOKE ATTUNE - SN/A  COMPONENT FEM SZ 5 L KNEE JOSE J CRUCE RET BROOKE ATTUNE N/A [employer] [facility] [id number] Left 1 Implanted   PAT BROOKE DOME MEDIAL 35MM -- ATTUNE - SN/A  PAT BROOKE DOME MEDIAL 35MM -- ATTUNE N/A [employer]_ [id number] Left 1 Implanted   BASEPLATE TIB SZ 5 FIX BEAR BROOKE S+ TECHNOLOGY ATTUNE - SN/A  BASEPLATE TIB SZ 5 FIX BEAR BROOKE S+ TECHNOLOGY ATTUNE N/A [employer] [facility] [id number] Left 1 Implanted   INSERT TIB FIX BEAR 5 7 MM LT MEDL KNEE STBL AOX ATTUNE - SN/A  INSERT TIB FIX BEAR 5 7 MM LT MEDL KNEE STBL AOX ATTUNE N/A [employer] Top [employer]_ [id number] Left 1 Implanted       INDICATIONS: The patient is an 74 yrs female with progressive debilitating left knee pain due to severe osteoarthritis. Symptoms have progressed despite comprehensive conservative treatment and they presents for left total knee replacement. Risks, benefits, alternatives of the procedure were reviewed in detail and the patient desired to proceed.  Risks including bleeding, infection, damage to surrounding structures, blood clots, pulmonary embolism, and death were discussed. The patient understood understands the increased risk for perioperative medical complications due to their medical comorbidities. DESCRIPTION OF PROCEDURE:DESCRIPTION OF PROCEDURE: Epidural/spinal anesthesia was initiated. Two grams of cefazolin were administered within 30 minutes of incision. The left lower extremity was prepped and draped in the usual sterile fashion. The skin was covered with Ioban occlusive dressing. A tourniquet was only employed for cementation- total time- 12 minutes. A midline skin incision was made with a 10 blade and small flaps were elevated. A parapatellar arthrotomy was made to the knee. I released the ACL and menisci and performed a limited medial release. I then obtained all anatomic landmarks using the PHRQL system. The tibia was subluxed and I carried out a tibial resection with approximately 2-3 mm from the low side . The meniscal remnants were removed. I then placed the tensor  and took the knee through a range of motion. I utlized the balance curve to modify our femoral cuts. Anterior, posterior, and chamfer cuts were carried out using the [employer] robot. I then prepared the femur for the planned size and drilled lug holes. The tibial was then sized and correct rotation was identified using the medial 1/3 of the tibial tubercle as a landmark. The tibia was prepared using a drill followed by a keel punch. Trials were placed. The patella was sized using a caliper and an appropriate resection  was performed. Lug holes were drilled and a trial was fitted. The knee tracked well with all trial implants. The trials were then removed. Bony surfaces were drilled, lavaged, and dried. All components were cemented. Excess cement was removed. Polyethylene component was placed. After the cement had fully cured, the knee was ranged and  irrigated copiously with pulsatile lavage.      All surrounding soft tissues were infiltrated with local anesthetic. The arthrotomy  was closed with running quill suture and 0 vicryl suture. Skin and subcutaneous tissues were irrigated and closed in standard fashion with 2-0 vicryl and 3-0 monocryl. An aquacel dressing was placed. The patient underwent straight catheterization at the end of the case. Prisca Diana was critical for moore portions of the case including retractor management, wound closure, and dressing application. There was no one else available to perform these specific duties. There were no complications. The procedure was a robotic assisted  LEFT TOTAL KNEE REPLACEMENT. No specimens were obtained/sent. The patient was transferred to the recovery room in stable condition.       Scott Roberts MD

## 2022-07-13 NOTE — PROGRESS NOTES
Primary Nurse Trixie Tamayo RN and Rhoda Estrella RN performed a dual skin assessment on this patient No impairment noted  Jewel score is 20

## 2022-07-13 NOTE — ROUTINE PROCESS
Patient: Jessica Chin MRN: [id number]  SSN: [id number]   YOB: 1950  Age: 74 y.o. Sex: female     Patient is status post Procedure(s):  LEFT TOTAL KNEE ARTHROPLASTY (VELYS).     Surgeon(s) and Role:     * Jeremy Antonio MD - Primary    Local/Dose/Irrigation:  See [facility]                  Peripheral IV 07/13/22 Anterior;Right Forearm (Active)                           Dressing/Packing:  Incision 07/13/22 Knee Left-Dressing/Treatment: Other (Comment) (dermabond, aquacel, cast padding, ace wrap) (07/13/22 1006)

## 2022-07-13 NOTE — ANESTHESIA PROCEDURE NOTES
Spinal Block    Start time: 7/13/2022 8:36 AM  End time: 7/13/2022 8:42 AM  Performed by: Samantha Weber MD  Authorized by: Samantha Weber MD     Pre-procedure:   Indications: primary anesthetic  Preanesthetic Checklist: patient identified, risks and benefits discussed, anesthesia consent, site marked, patient being monitored and timeout performed      Spinal Block:   Patient Position:  Seated  Prep Region:  Lumbar  Prep: Betadine and patient draped      Location:  L3-4  Technique:  Single shot  Local: mepivacaine-pf (CARBOCAINE-PF) 1.5% PF injection, 60 mg        Needle:   Needle Type:  Pencan  Needle Gauge:  24 G  Attempts:  1      Events: CSF confirmed, no blood with aspiration and no paresthesia        Assessment:  Insertion:  Uncomplicated  Patient tolerance:  Patient tolerated the procedure well with no immediate complications

## 2022-07-13 NOTE — ANESTHESIA PREPROCEDURE EVALUATION
Relevant Problems   No relevant active problems       Anesthetic History   No history of anesthetic complications            Review of Systems / Medical History  Patient summary reviewed, nursing notes reviewed and pertinent labs reviewed    Pulmonary        Sleep apnea           Neuro/Psych         Psychiatric history     Cardiovascular    Hypertension          CAD    Exercise tolerance: >4 METS     GI/Hepatic/Renal  Within defined limits              Endo/Other        Arthritis     Other Findings              Physical Exam    Airway  Mallampati: II  TM Distance: > 6 cm  Neck ROM: normal range of motion   Mouth opening: Normal     Cardiovascular  Regular rate and rhythm,  S1 and S2 normal,  no murmur, click, rub, or gallop             Dental  No notable dental hx       Pulmonary  Breath sounds clear to auscultation               Abdominal  GI exam deferred       Other Findings            Anesthetic Plan    ASA: 3  Anesthesia type: spinal      Post-op pain plan if not by surgeon: peripheral nerve block single    Induction: Intravenous  Anesthetic plan and risks discussed with: Patient

## 2022-07-13 NOTE — PROGRESS NOTES
Occupational Therapy  07/13/22      Orders received, chart review completed. Note patient POD #0 s/p L ABDULAZIZ. OT will follow up tomorrow for evaluation. Recommend OOB to chair three times a day for meals, self-completion of ADLs as able and medically stable.      Thank you,   Claudio James, OTD, OTR/L

## 2022-07-13 NOTE — ANESTHESIA PROCEDURE NOTES
Peripheral Block    Start time: 7/13/2022 8:15 AM  End time: 7/13/2022 8:22 AM  Performed by: Frankie Dinero MD  Authorized by: Frankie Dinero MD       Pre-procedure: Indications: at surgeon's request and post-op pain management    Preanesthetic Checklist: patient identified, risks and benefits discussed, site marked, timeout performed, anesthesia consent given and patient being monitored    Timeout Time: 08:15 EDT          Block Type:   Block Type:   Adductor canal  Laterality:  Left  Monitoring:  Standard ASA monitoring, continuous pulse ox, frequent vital sign checks, heart rate, oxygen and responsive to questions  Injection Technique:  Single shot  Procedures: ultrasound guided    Patient Position: supine  Prep: chlorhexidine    Location:  Mid thigh  Needle Type:  Stimuplex  Needle Gauge:  22 G  Needle Localization:  Ultrasound guidance  Medication Injected:  Ropivacaine (PF) (NAROPIN)(0.5%) 5 mg/mL injection, 30 mL  Med Admin Time: 7/13/2022 8:22 AM    Assessment:  Number of attempts:  1  Injection Assessment:  Incremental injection every 5 mL, negative aspiration for CSF, no paresthesia, no intravascular symptoms, negative aspiration for blood and local visualized surrounding nerve on ultrasound  Patient tolerance:  Patient tolerated the procedure well with no immediate complications

## 2022-07-13 NOTE — DISCHARGE INSTRUCTIONS
Discharge Instructions Knee Replacement  Dr. Moncho Mayer  Patient Name  Charles Hill  Date of procedure  7/13/2022    Procedure  Procedure(s):  LEFT TOTAL KNEE ARTHROPLASTY (VELYS)  Surgeon  Surgeon(s) and Role:     * Shivani Peñaloza MD - Primary  Date of discharge: No discharge date for patient encounter. PCP: Audie Tay MD    Follow up care   Follow up visit with Dr. Moncho Mayer in 4 weeks. Call [phone] [facility] to make an appointment.  If Home Health has been arranged for you, they will call you to arrange dates/times for visits. Call them if you do not hear from them within 24 hours after you go home. Activity at home   Take a short walk every hour; except at night when sleeping.  Do your Home Exercise Program 3 times every day.  After exercising lie down and elevate your leg on pillows for 15-30 minutes to decrease swelling.  Refer to your patient notebook for more information. Bathing and caring for your incision   You may take a shower with your waterproof dressing on your knee.  The waterproof dressing is to stay on your knee for 7 days.  On the 7th day have someone gently peel the dressing off by lifting the edge and stretching it to break the seal.   You may then leave your incision open to air unless you see drainage from your knee. Preventing blood clots   Take Aspirin 81mg twice each day for one month (30 days) following surgery.  Call Dr. Moncho Mayer for signs of a blood clot in your leg: calf pain, tenderness, redness, swelling of lower leg   Preventing lung congestion   Use your incentive spirometer 4 times a day; do 10 repetitions each time   Remember to keep the small blue ball between the two arrows when taking a slow, deep breath   Pain Management   Get up and walk a short distance to relieve pain and stiffness.  Place ice wrap on your knee except when you are walking. The gel ice packs should be changed about every 4 hours.    Elevate your leg on pillows for 15-30 minutes. Pain Medications   Take Tylenol 650mg (take two 325mg tablets) every 6 hours for the next 4 weeks.  Take Famotidine (Pepcid) 20mg twice a day to prevent an upset stomach (if taking Aspirin and/or Meloxicam).  If needed, take Tramadol (narcotic pain pill) every 6 hours as prescribed.  If Tramadol has not relieved your pain within 1 hour, take Oxycodone 5mg (narcotic pain pill). Take Oxycodone only if needed and stop once your pain is tolerable.  Take all medications with a small amount of food.  As your pain decreases, take the narcotics less often or take ½ of a pill.  Call Dr. Geetha Rodriguez if you have side effects from your narcotic pain medication: itching, drowsiness, dizziness, upset stomach, dry mouth, constipation or if you medication is not relieving your pain. Diet after surgery   You may resume your normal diet. Include vegetables, fruit, whole grains, lean meats, and low-fat dairy products. Eat food high in fiber.  Drink plenty of fluids, including 8 cups of water daily.  Take a stool softener (Senokot-s or Colace) to prevent constipation. If constipation occurs you may take a laxative (Milk of Magnesia, Dulcolax tablets). Avoid after surgery   Do not take any over-the-counter medication for pain except Tylenol   Do not take more than 3000mg (3 Grams) of Tylenol in 24 hours   Do not drink alcoholic beverages   Do not smoke   Do not drive until seen for follow up appointment   Do not place frozen gel pack directly on your skin. It can cause frostbite.  Do not take a tub bath, swim or get in a hot tub for 8 weeks  Prevention of falls and safety at home   Set up an area where you can rest comfortably leaving space around furniture to allow you to walk with your walker.  Keep stairs, hallways and bathrooms well lit; especially at night.  Arrange for care for your pets   Keep your home free of clutter.    Call Dr. Geetha Rodriguez at [phone] for:   Pain that is not relieved by pain medication, ice and activity   Side effects of medications   Increased/spread of bruising   Warning signs of infection:  ? persistent fever greater than 100 degrees  ? shaking or chills  ? increased redness, tenderness, swelling or drainage from incision  ? increased pain during activity or rest   Warning signs of a blood clot in your leg:  ? increased pain in your calf  ? tenderness or redness  ? increased swelling or knee, calf, ankle or foot    Call [phone] after 5pm or on a weekend.  The on call physician will return your phone call  Call your Primary Care Doctor for:    Concerns about your medical conditions such as diabetes, high blood pressure, asthma, congestive heart failure   Blood sugars greater than 180   Persistent headache or dizziness   Coughing or congestion   Constipation or diarrhea   Burning when you go to the bathroom   Abnormal heart rate (fast or  slow)      Call 911 and go to the nearest hospital for:    Sudden increased shortness of breath   Sudden onset of chest pain   Difficulty breathing   Localized chest pain with coughing or taking a deep breath

## 2022-07-13 NOTE — PROGRESS NOTES
Problem: Falls - Risk of  Goal: *Absence of Falls  Description: Document Jose Vieira Fall Risk and appropriate interventions in the flowsheet.   Outcome: Progressing Towards Goal  Note: Fall Risk Interventions:  Mobility Interventions: Bed/chair exit alarm,Communicate number of staff needed for ambulation/transfer,Patient to call before getting OOB         Medication Interventions: Assess postural VS orthostatic hypotension,Evaluate medications/consider consulting pharmacy    Elimination Interventions: Call light in reach,Bed/chair exit alarm,Patient to call for help with toileting needs    History of Falls Interventions: Bed/chair exit alarm,Door open when patient unattended,Evaluate medications/consider consulting pharmacy         Problem: Patient Education: Go to Patient Education Activity  Goal: Patient/Family Education  Outcome: Progressing Towards Goal     Problem: Patient Education: Go to Patient Education Activity  Goal: Patient/Family Education  Outcome: Progressing Towards Goal     Problem: Patient Education: Go to Patient Education Activity  Goal: Patient/Family Education  Outcome: Progressing Towards Goal     Problem: Knee Replacement: Day of Surgery/Unit  Goal: Off Pathway (Use only if patient is Off Pathway)  Outcome: Progressing Towards Goal  Goal: Activity/Safety  Outcome: Progressing Towards Goal  Note: Patient educated to not get out of bed alone, patient to call for help before getting out of bed, call bell left in patient's hand  Goal: Consults, if ordered  Outcome: Progressing Towards Goal  Goal: Diagnostic Test/Procedures  Outcome: Progressing Towards Goal  Goal: Nutrition/Diet  Outcome: Progressing Towards Goal  Goal: Medications  Outcome: Progressing Towards Goal  Goal: Respiratory  Outcome: Progressing Towards Goal  Note: Patient educated on how to use incentive spirometer and to use it ten times per day  Goal: Treatments/Interventions/Procedures  Outcome: Progressing Towards Goal  Goal: Psychosocial  Outcome: Progressing Towards Goal  Goal: *Initiate mobility  Outcome: Progressing Towards Goal  Goal: *Optimal pain control at patient's stated goal  Outcome: Progressing Towards Goal  Goal: *Hemodynamically stable  Outcome: Progressing Towards Goal

## 2022-07-13 NOTE — PROGRESS NOTES
Problem: Mobility Impaired (Adult and Pediatric)  Goal: *Acute Goals and Plan of Care (Insert Text)  Description: FUNCTIONAL STATUS PRIOR TO ADMISSION: Patient was independent and active without use of DME.    HOME SUPPORT PRIOR TO ADMISSION: The patient lived with  but did not require assist.    Physical Therapy Goals  Initiated 7/13/2022    1. Patient will move from supine to sit and sit to supine , scoot up and down, and roll side to side in bed with modified independence within 4 days. 2. Patient will perform sit to stand with modified independence within 4 days. 3. Patient will ambulate with modified independence for 150 feet with the least restrictive device within 4 days. 4. Patient will ascend/descend 4 stairs with cane and one handrail(s) with modified independence within 4 days. 5. Patient will perform home exercise program per protocol with independence within 4 days. 6. Patient will demonstrate AROM 0-90 degrees in operative joint within 4 days. Outcome: Progressing Towards Goal   PHYSICAL THERAPY EVALUATION  Patient: Romulo Lenz (54 y.o. female)  Date: 7/13/2022  Primary Diagnosis: Primary osteoarthritis [M19.91]  Osteoarthritis of left knee, unspecified osteoarthritis type [M17.12]  Procedure(s) (LRB):  LEFT TOTAL KNEE ARTHROPLASTY (VELYS) (Left) Day of Surgery   Precautions:   Fall,WBAT    ASSESSMENT  Based on the objective data described below, the patient presents with  impairment in functional mobility, activity tolerance and balance s/p L TKA. PLOF: Independent with ADLs and IADLs. Patient lives on first floor of a two story home with . 2 steps with rail to enter. Patient's mobility was on target for POD#0. Will address more exercises, increase gait distance, negotiate stairs and assess for discharge at am PT session tomorrow. Patient instructed NOT to get up from bed, chair or commode without calling for assistance.  Initiated post TKA exercise protocol and wrote same on [employer]. Anticipate discharge after 1-2 more PT visits. Current Level of Function Impacting Discharge (mobility/balance): Contact guard assistance for all mobility. Ambulated 72 ft with RW and gait belt, antalgic but steady. Functional Outcome Measure: The patient scored 55/100 on the Barthel outcome measure which is indicative of moderate impaired ability to care for basic self-needs/dependency on others. .      Other factors to consider for discharge: Motivated/A & O x 4/Supportive Family/Independent PLOF      Patient will benefit from skilled therapy intervention to address the above noted impairments. PLAN :  Recommendations and Planned Interventions: bed mobility training, transfer training, gait training, therapeutic exercises, patient and family training/education, and therapeutic activities      Frequency/Duration: Patient will be followed by physical therapy:  twice daily to address goals. Recommendation for discharge: (in order for the patient to meet his/her long term goals)  Physical therapy at least 2 days/week in the home     This discharge recommendation:  Has been made in collaboration with the attending provider and/or case management    IF patient discharges home will need the following DME: patient owns DME required for discharge         SUBJECTIVE:   Patient stated I need to use the bathroom. It's not hurting yet.     OBJECTIVE DATA SUMMARY:   HISTORY:    Past Medical History:   Diagnosis Date    Cancer ([facility]     [facility] AND [facility] - MULTIPLE    COVID-19 06/12/2022    Hypertension     Psychiatric disorder     ANXIETY    Rheumatoid arteritis ([facility]     Sleep apnea     CPAP     Past Surgical History:   Procedure Laterality Date    HX APPENDECTOMY  1963    HX CATARACT REMOVAL Right     HX COLONOSCOPY      HX ENDOSCOPY      HX HEART CATHETERIZATION  2020    NO STENT       Personal factors and/or comorbidities impacting plan of care:  Motivated/A & O x 4/Supportive Family/Independent PLOF     Home Situation  Home Environment: Private residence  # Steps to Enter: 2  Rails to Enter: Yes  Hand Rails : Right  Wheelchair Ramp: No  One/Two Story Residence: Two story, live on 1st floor  Living Alone: No  Support Systems: Spouse/Significant Other  Patient Expects to be Discharged to[de-identified] Home with home health  Current DME Used/Available at Home: Cane, straight,Walker, rolling  Tub or Shower Type: Shower    EXAMINATION/PRESENTATION/DECISION MAKING:   Critical Behavior:  Neurologic State: Alert           Hearing:     Skin:  Ace Wrap Intact with no drainage appreciated. Edema: Normal post-op inflammation   Range Of Motion:  AROM: Generally decreased, functional           PROM: Generally decreased, functional           Strength:    Strength: Generally decreased, functional                    Tone & Sensation:   Tone: Normal              Sensation: Intact               Coordination:  Coordination: Within functional limits  Vision:      Functional Mobility:  Bed Mobility:     Supine to Sit: Contact guard assistance  Sit to Supine: Contact guard assistance  Scooting: Contact guard assistance  Transfers:  Sit to Stand: Contact guard assistance  Stand to Sit: Contact guard assistance                       Balance:   Sitting: Intact  Standing: Impaired; Without support  Standing - Static: Good;Constant support  Standing - Dynamic : Good;Constant support  Ambulation/Gait Training:  Distance (ft): 65 Feet (ft)  Assistive Device: Walker, rolling;Gait belt  Ambulation - Level of Assistance: Contact guard assistance        Gait Abnormalities: Antalgic;Decreased step clearance; Step to gait     Left Side Weight Bearing: As tolerated  Base of Support: Widened;Shift to right  Stance: Left decreased  Speed/Minnie: Slow  Step Length: Right shortened  Swing Pattern: Left asymmetrical     Interventions: Safety awareness training;Verbal cues            Stairs: Therapeutic Exercises:    Ankle Pumps  Ham Sets  Quad Sets  Heel Slides  X 10 each, 5-7x daily      Functional Measure:  Barthel Index:    Bathin  Bladder: 10  Bowels: 10  Groomin  Dressin  Feeding: 10  Mobility: 0  Stairs: 0  Toilet Use: 5  Transfer (Bed to Chair and Back): 10  Total: 55/100       The Barthel ADL Index: Guidelines  1. The index should be used as a record of what a patient does, not as a record of what a patient could do. 2. The main aim is to establish degree of independence from any help, physical or verbal, however minor and for whatever reason. 3. The need for supervision renders the patient not independent. 4. A patient's performance should be established using the best available evidence. Asking the patient, friends/relatives and nurses are the usual sources, but direct observation and common sense are also important. However direct testing is not needed. 5. Usually the patient's performance over the preceding 24-48 hours is important, but occasionally longer periods will be relevant. 6. Middle categories imply that the patient supplies over 50 per cent of the effort. 7. Use of aids to be independent is allowed. Score Interpretation (from [address] [address])    Independent   60-79 Minimally independent   40-59 Partially dependent   20-39 Very dependent   <20 Totally dependent     -Tony Nix., Barthel, D.W. (1965). Functional evaluation: the Barthel Index. [facility] ([address] (1997). The Barthel activities of daily living index: self-reporting versus actual performance in the old (> or = 75 years). Journal of [address] [place] [address] 45(7), [facility], ..., [facility]. (). Measuring the change in disability after inpatient rehabilitation; comparison of the responsiveness of the Barthel Index and Functional Placer Measure. Journal of Neurology, Neurosurgery, and Psychiatry, 66(4), 645-010.   -TELMA Rogers Scholte op Leslye Aviles M.A. (2004) Assessment of post-stroke quality of life in cost-effectiveness studies: The usefulness of the Barthel Index and the EuroQoL-5D. Quality of Life Research, 15, 102-71           Physical Therapy Evaluation Charge Determination   History Examination Presentation Decision-Making   LOW Complexity : Zero comorbidities / personal factors that will impact the outcome / POC LOW Complexity : 1-2 Standardized tests and measures addressing body structure, function, activity limitation and / or participation in recreation  LOW Complexity : Stable, uncomplicated  LOW Complexity : FOTO score of       Based on the above components, the patient evaluation is determined to be of the following complexity level: LOW     Pain Ratin/10    Activity Tolerance:   Good    After treatment patient left in no apparent distress:   Supine in bed, Call bell within reach, Caregiver / family present, Side rails x 3, and nurse notified. COMMUNICATION/EDUCATION:   The patients plan of care was discussed with: Registered nurse, Physician, Case management, and Rehabilitation technician. Fall prevention education was provided and the patient/caregiver indicated understanding., Patient/family have participated as able in goal setting and plan of care. , and Patient/family agree to work toward stated goals and plan of care.     Thank you for this referral.  Ashely Diaz   Time Calculation: 35 mins

## 2022-07-13 NOTE — ANESTHESIA POSTPROCEDURE EVALUATION
Post-Anesthesia Evaluation and Assessment    Patient: Annie Leonardo MRN: [id number]  SSN: [id number]    YOB: 1950  Age: 74 y.o. Sex: female      I have evaluated the patient and they are stable and ready for discharge from the PACU. Cardiovascular Function/Vital Signs  Visit Vitals  BP (!) 134/106   Pulse 62   Temp 36.7 °C (98 °F)   Resp 22   Ht 5' 4\" (1.626 m)   Wt 75.6 kg (166 lb 10.7 oz)   SpO2 100%   BMI 28.61 kg/m²       Patient is status post Spinal anesthesia for Procedure(s):  LEFT TOTAL KNEE ARTHROPLASTY (VELYS). Nausea/Vomiting: None    Postoperative hydration reviewed and adequate. Pain:  Pain Scale 1: Numeric (0 - 10) (07/13/22 1158)  Pain Intensity 1: 5 (07/13/22 1158)   Managed    Neurological Status:   Neuro (WDL): Within Defined Limits (07/13/22 1115)  Neuro  Neurologic State: Alert (07/13/22 1115)  LUE Motor Response: Purposeful (07/13/22 1115)  LLE Motor Response: Purposeful (07/13/22 1115)  RUE Motor Response: Purposeful (07/13/22 1115)  RLE Motor Response: Purposeful (07/13/22 1115)   At baseline    Mental Status, Level of Consciousness: Alert and  oriented to person, place, and time    Pulmonary Status:   O2 Device: None (Room air) (07/13/22 1115)   Adequate oxygenation and airway patent    Complications related to anesthesia: None    Post-anesthesia assessment completed. No concerns    Signed By: Sam Zapata MD     July 13, 2022              Procedure(s):  LEFT TOTAL KNEE ARTHROPLASTY (VELYS). spinal    <BSHSIANPOST>    INITIAL Post-op Vital signs:   Vitals Value Taken Time   /52 07/13/22 1245   Temp 36.7 °C (98 °F) 07/13/22 1115   Pulse 58 07/13/22 1250   Resp 13 07/13/22 1250   SpO2 100 % 07/13/22 1250   Vitals shown include unvalidated device data.

## 2022-07-13 NOTE — PROGRESS NOTES
Medicare Outpatient Observation Notice (MOON) provided to patient/representative with verbal explanation of the notice. Time allotted for questions regarding the notice. Patient /representative provided a completed copy of the MOON notice. Copy placed on bedside chart.   Audra Solis, Care Management Assistant

## 2022-07-14 VITALS
HEART RATE: 70 BPM | SYSTOLIC BLOOD PRESSURE: 136 MMHG | TEMPERATURE: 98.5 F | OXYGEN SATURATION: 95 % | BODY MASS INDEX: 28.45 KG/M2 | DIASTOLIC BLOOD PRESSURE: 71 MMHG | WEIGHT: 166.67 LBS | HEIGHT: 64 IN | RESPIRATION RATE: 16 BRPM

## 2022-07-14 LAB
ANION GAP SERPL CALC-SCNC: 7 MMOL/L (ref 5–15)
BUN SERPL-MCNC: 22 MG/DL (ref 6–20)
BUN/CREAT SERPL: 18 (ref 12–20)
CALCIUM SERPL-MCNC: 8.4 MG/DL (ref 8.5–10.1)
CHLORIDE SERPL-SCNC: 107 MMOL/L (ref 97–108)
CO2 SERPL-SCNC: 25 MMOL/L (ref 21–32)
CREAT SERPL-MCNC: 1.24 MG/DL (ref 0.55–1.02)
GLUCOSE BLD STRIP.AUTO-MCNC: 93 MG/DL (ref 65–117)
GLUCOSE SERPL-MCNC: 94 MG/DL (ref 65–100)
HGB BLD-MCNC: 10.9 G/DL (ref 11.5–16)
POTASSIUM SERPL-SCNC: 4 MMOL/L (ref 3.5–5.1)
SERVICE CMNT-IMP: NORMAL
SODIUM SERPL-SCNC: 139 MMOL/L (ref 136–145)

## 2022-07-14 PROCEDURE — 80048 BASIC METABOLIC PNL TOTAL CA: CPT

## 2022-07-14 PROCEDURE — 97116 GAIT TRAINING THERAPY: CPT

## 2022-07-14 PROCEDURE — 97535 SELF CARE MNGMENT TRAINING: CPT

## 2022-07-14 PROCEDURE — 36415 COLL VENOUS BLD VENIPUNCTURE: CPT

## 2022-07-14 PROCEDURE — 74011250637 HC RX REV CODE- 250/637: Performed by: PHYSICIAN ASSISTANT

## 2022-07-14 PROCEDURE — 96374 THER/PROPH/DIAG INJ IV PUSH: CPT

## 2022-07-14 PROCEDURE — 85018 HEMOGLOBIN: CPT

## 2022-07-14 PROCEDURE — 74011000250 HC RX REV CODE- 250: Performed by: PHYSICIAN ASSISTANT

## 2022-07-14 PROCEDURE — 97165 OT EVAL LOW COMPLEX 30 MIN: CPT

## 2022-07-14 PROCEDURE — 74011250636 HC RX REV CODE- 250/636: Performed by: PHYSICIAN ASSISTANT

## 2022-07-14 PROCEDURE — 82962 GLUCOSE BLOOD TEST: CPT

## 2022-07-14 RX ORDER — FAMOTIDINE 20 MG/1
20 TABLET, FILM COATED ORAL DAILY
Status: DISCONTINUED | OUTPATIENT
Start: 2022-07-15 | End: 2022-07-14 | Stop reason: HOSPADM

## 2022-07-14 RX ORDER — NALOXONE HYDROCHLORIDE 4 MG/.1ML
SPRAY NASAL
Qty: 1 EACH | Refills: 0 | Status: SHIPPED | OUTPATIENT
Start: 2022-07-14

## 2022-07-14 RX ORDER — ONDANSETRON 4 MG/1
4 TABLET, ORALLY DISINTEGRATING ORAL
Qty: 20 TABLET | Refills: 0 | Status: SHIPPED | OUTPATIENT
Start: 2022-07-14

## 2022-07-14 RX ORDER — ACETAMINOPHEN 325 MG/1
650 TABLET ORAL EVERY 6 HOURS
Qty: 100 TABLET | Refills: 0 | Status: SHIPPED | OUTPATIENT
Start: 2022-07-14

## 2022-07-14 RX ORDER — TRAMADOL HYDROCHLORIDE 50 MG/1
50 TABLET ORAL
Qty: 28 TABLET | Refills: 0 | Status: SHIPPED | OUTPATIENT
Start: 2022-07-14 | End: 2022-07-21

## 2022-07-14 RX ORDER — AMOXICILLIN 250 MG
1 CAPSULE ORAL 2 TIMES DAILY
Qty: 60 TABLET | Refills: 0 | Status: SHIPPED | OUTPATIENT
Start: 2022-07-14 | End: 2022-08-13

## 2022-07-14 RX ORDER — POLYETHYLENE GLYCOL 3350 17 G/17G
17 POWDER, FOR SOLUTION ORAL DAILY
Qty: 10 PACKET | Refills: 0 | Status: SHIPPED | OUTPATIENT
Start: 2022-07-15 | End: 2022-07-25

## 2022-07-14 RX ORDER — ASPIRIN 81 MG/1
81 TABLET ORAL 2 TIMES DAILY
Qty: 60 TABLET | Refills: 0 | Status: SHIPPED | OUTPATIENT
Start: 2022-07-14 | End: 2022-08-13

## 2022-07-14 RX ORDER — OXYCODONE HYDROCHLORIDE 5 MG/1
5 TABLET ORAL
Qty: 40 TABLET | Refills: 0 | Status: SHIPPED | OUTPATIENT
Start: 2022-07-14 | End: 2022-07-21

## 2022-07-14 RX ADMIN — KETOROLAC TROMETHAMINE 15 MG: 30 INJECTION, SOLUTION INTRAMUSCULAR; INTRAVENOUS at 06:53

## 2022-07-14 RX ADMIN — FAMOTIDINE 20 MG: 20 TABLET, FILM COATED ORAL at 08:39

## 2022-07-14 RX ADMIN — ONDANSETRON HYDROCHLORIDE 4 MG: 2 SOLUTION INTRAMUSCULAR; INTRAVENOUS at 12:08

## 2022-07-14 RX ADMIN — SODIUM CHLORIDE, PRESERVATIVE FREE 10 ML: 5 INJECTION INTRAVENOUS at 06:54

## 2022-07-14 RX ADMIN — DOCUSATE SODIUM 50MG AND SENNOSIDES 8.6MG 1 TABLET: 8.6; 5 TABLET, FILM COATED ORAL at 08:39

## 2022-07-14 RX ADMIN — WATER 2 G: 1 INJECTION INTRAMUSCULAR; INTRAVENOUS; SUBCUTANEOUS at 01:28

## 2022-07-14 RX ADMIN — KETOROLAC TROMETHAMINE 15 MG: 30 INJECTION, SOLUTION INTRAMUSCULAR; INTRAVENOUS at 01:28

## 2022-07-14 RX ADMIN — ISOSORBIDE DINITRATE 30 MG: 10 TABLET ORAL at 08:40

## 2022-07-14 RX ADMIN — ASPIRIN 81 MG: 81 TABLET, COATED ORAL at 08:39

## 2022-07-14 RX ADMIN — ACETAMINOPHEN 650 MG: 325 TABLET ORAL at 01:28

## 2022-07-14 RX ADMIN — ACETAMINOPHEN 650 MG: 325 TABLET ORAL at 11:44

## 2022-07-14 RX ADMIN — ACETAMINOPHEN 650 MG: 325 TABLET ORAL at 06:53

## 2022-07-14 RX ADMIN — POLYETHYLENE GLYCOL 3350 17 G: 17 POWDER, FOR SOLUTION ORAL at 08:39

## 2022-07-14 NOTE — PROGRESS NOTES
Problem: Falls - Risk of  Goal: *Absence of Falls  Description: Document Yovani Culp Fall Risk and appropriate interventions in the flowsheet.   Outcome: Resolved/Met  Note: Fall Risk Interventions:  Mobility Interventions: Communicate number of staff needed for ambulation/transfer,Patient to call before getting OOB,Bed/chair exit alarm         Medication Interventions: Assess postural VS orthostatic hypotension,Evaluate medications/consider consulting pharmacy,Bed/chair exit alarm    Elimination Interventions: Elevated toilet seat,Call light in reach,Patient to call for help with toileting needs    History of Falls Interventions: Bed/chair exit alarm,Door open when patient unattended         Problem: Patient Education: Go to Patient Education Activity  Goal: Patient/Family Education  Outcome: Resolved/Met     Problem: Patient Education: Go to Patient Education Activity  Goal: Patient/Family Education  Outcome: Resolved/Met     Problem: Patient Education: Go to Patient Education Activity  Goal: Patient/Family Education  Outcome: Resolved/Met     Problem: Knee Replacement: Day of Surgery/Unit  Goal: Off Pathway (Use only if patient is Off Pathway)  Outcome: Resolved/Met  Goal: Activity/Safety  Outcome: Resolved/Met  Goal: Consults, if ordered  Outcome: Resolved/Met  Goal: Diagnostic Test/Procedures  Outcome: Resolved/Met  Goal: Nutrition/Diet  Outcome: Resolved/Met  Goal: Medications  Outcome: Resolved/Met  Goal: Respiratory  Outcome: Resolved/Met  Goal: Treatments/Interventions/Procedures  Outcome: Resolved/Met  Goal: Psychosocial  Outcome: Resolved/Met  Goal: *Initiate mobility  Outcome: Resolved/Met  Goal: *Optimal pain control at patient's stated goal  Outcome: Resolved/Met  Goal: *Hemodynamically stable  Outcome: Resolved/Met     Problem: Knee Replacement: Post-Op Day 1  Goal: Off Pathway (Use only if patient is Off Pathway)  Outcome: Resolved/Met  Goal: Activity/Safety  Outcome: Resolved/Met  Goal: Diagnostic Test/Procedures  Outcome: Resolved/Met  Goal: Nutrition/Diet  Outcome: Resolved/Met  Goal: Medications  Outcome: Resolved/Met  Goal: Respiratory  Outcome: Resolved/Met  Goal: Treatments/Interventions/Procedures  Outcome: Resolved/Met  Goal: Psychosocial  Outcome: Resolved/Met  Goal: Discharge Planning  Outcome: Resolved/Met  Goal: *Demonstrates progressive activity  Outcome: Resolved/Met  Goal: *Optimal pain control at patient's stated goal  Outcome: Resolved/Met  Goal: *Hemodynamically stable  Outcome: Resolved/Met  Goal: *Discharge plan identified  Outcome: Resolved/Met

## 2022-07-14 NOTE — PROGRESS NOTES
Problem: Mobility Impaired (Adult and Pediatric)  Goal: *Acute Goals and Plan of Care (Insert Text)  Description: FUNCTIONAL STATUS PRIOR TO ADMISSION: Patient was independent and active without use of DME.    HOME SUPPORT PRIOR TO ADMISSION: The patient lived with  but did not require assist.    Physical Therapy Goals  Initiated 7/13/2022    1. Patient will move from supine to sit and sit to supine , scoot up and down, and roll side to side in bed with modified independence within 4 days. 2. Patient will perform sit to stand with modified independence within 4 days. 3. Patient will ambulate with modified independence for 150 feet with the least restrictive device within 4 days. 4. Patient will ascend/descend 4 stairs with cane and one handrail(s) with modified independence within 4 days. 5. Patient will perform home exercise program per protocol with independence within 4 days. 6. Patient will demonstrate AROM 0-90 degrees in operative joint within 4 days. Outcome: Resolved/Met   PHYSICAL THERAPY TREATMENT/DISCHARGE  Patient: Toi Gomez (29 y.o. female)  Date: 7/14/2022  Diagnosis: Primary osteoarthritis [M19.91]  Osteoarthritis of left knee, unspecified osteoarthritis type [M17.12] <principal problem not specified>  Procedure(s) (LRB):  LEFT TOTAL KNEE ARTHROPLASTY (VELYS) (Left) 1 Day Post-Op  Precautions: Fall,WBAT  Chart, physical therapy assessment, plan of care and goals were reviewed. ASSESSMENT  Patient continues with skilled PT services and has met acute care PT  goals. Patient is cleared for discharge from PT standpoint. Patient  is independent with post-op TKA exercise protocol and has same in written, illlustrated form. PT Discharge instructions reviewed. Patient and  demonstrated understanding and watched Discharge Video.      Barthel Functional Score has improved to 75/100, indicating minimal-moderate amimpaired ability to care for basic self-needs/dependency on others. Other factors to consider for discharge: Motivated/A & O x 4/Supportive Family/Independent PLOF          PLAN :  Patient will be discharged from acute skilled physical therapy at this time. Rationale for discharge:  Goals achieved    Recommendation for discharge: (in order for the patient to meet his/her long term goals)  Physical therapy at least 2 days/week in the home     This discharge recommendation:  Has been made in collaboration with the attending provider and/or case management    IF patient discharges home will need the following DME: patient owns DME required for discharge       SUBJECTIVE:   Patient stated I am doing oksy.     OBJECTIVE DATA SUMMARY:   Critical Behavior:  Neurologic State: Alert  Orientation Level: Oriented X4  Cognition: Appropriate for age attention/concentration  Safety/Judgement: Good awareness of safety precautions  Functional Mobility Training:  Bed Mobility:     Supine to Sit: Stand-by assistance  Sit to Supine: Stand-by assistance  Scooting: Stand-by assistance        Transfers:  Sit to Stand: Stand-by assistance  Stand to Sit: Stand-by assistance        Bed to Chair: Stand-by assistance                    Balance:  Sitting: Intact  Standing: Intact; With support  Standing - Static: Good;Constant support  Standing - Dynamic : Good;Constant support  Ambulation/Gait Training:  Distance (ft): 150 Feet (ft)  Assistive Device: Walker, rolling;Gait belt  Ambulation - Level of Assistance: Stand-by assistance        Gait Abnormalities: Antalgic;Decreased step clearance     Left Side Weight Bearing: As tolerated  Base of Support: Widened;Shift to right  Stance: Left decreased  Speed/Minnie: Slow  Step Length: Right shortened  Swing Pattern: Left asymmetrical     Interventions: Safety awareness training;Verbal cues           Stairs:  Number of Stairs Trained: 4  Stairs - Level of Assistance: Contact guard assistance   Rail Use: Right  (right rail and cane)    Therapeutic Exercises:   Patient  is independent with post-op TKA exercise protocol and has same in written, illlustrated form. Pain Ratin/10    Activity Tolerance:   Good    After treatment patient left in no apparent distress:   Supine in bed, Call bell within reach, Caregiver / family present, Side rails x 3, and nurse notified. COMMUNICATION/COLLABORATION:   The patients plan of care was discussed with: Occupational therapist, Registered nurse, Physician, and Case management.      Altruik Runner   Time Calculation: 30 mins

## 2022-07-14 NOTE — PROGRESS NOTES
I have reviewed discharge instructions with the patient and spouse. The patient and spouse verbalized understanding. Patient discharged home with all personal belongings.

## 2022-07-14 NOTE — PROGRESS NOTES
Problem: Falls - Risk of  Goal: *Absence of Falls  Description: Document Green Salvia Fall Risk and appropriate interventions in the flowsheet.   7/14/2022 0013 by Jessica Avina RN  Outcome: Progressing Towards Goal  Note: Fall Risk Interventions:  Mobility Interventions: Bed/chair exit alarm,Patient to call before getting OOB         Medication Interventions: Patient to call before getting OOB    Elimination Interventions: Call light in reach    History of Falls Interventions: Bed/chair exit alarm      7/14/2022 0013 by Jessica Avina RN  Outcome: Progressing Towards Goal  Note: Fall Risk Interventions:  Mobility Interventions: Bed/chair exit alarm,Patient to call before getting OOB         Medication Interventions: Patient to call before getting OOB    Elimination Interventions: Call light in reach    History of Falls Interventions: Bed/chair exit alarm         Problem: Knee Replacement: Day of Surgery/Unit  Goal: Off Pathway (Use only if patient is Off Pathway)  7/14/2022 0013 by Jessica Avina RN  Outcome: Progressing Towards Goal  7/14/2022 0013 by Jessica Avina RN  Outcome: Progressing Towards Goal

## 2022-07-14 NOTE — PROGRESS NOTES
Ortho NP Note    POD# 1  s/p LEFT TOTAL KNEE ARTHROPLASTY (VELYS)   Pt seen in room with  at bedside    Pt resting in bed. Reports pain has remained tolerable throughout post operative period. Primarily using scheduled tylenol, toradol. Has been up with OT and felt well ambulating, still pending PT. Some nausea yesterday but just ate banana. No CP, no SOB. Denies dizziness/lightheadedness. VSS Afebrile. Visit Vitals  /71 (BP 1 Location: Right upper arm, BP Patient Position: At rest)   Pulse 70   Temp 98.5 °F (36.9 °C)   Resp 16   Ht 5' 4\" (1.626 m)   Wt 75.6 kg (166 lb 10.7 oz)   SpO2 95%   BMI 28.61 kg/m²       Voiding status: spontaneous void  Output (mL)  Urine Voided: 350 ml (07/13/22 1532)  Last Bowel Movement Date: 07/13/22 (07/14/22 0837)  Unmeasurable Output  Urine Occurrence(s): 1 (07/14/22 0434)  Straight Cath  Straight Cath: Nurse performed cath (07/13/22 1000)  Number of Attempts: 1 (07/13/22 1000)  Catheter Size: 16 FR (07/13/22 1000)  Time Catheter Inserted: 1035 (07/13/22 1000)  Time Catheter Removed: 1404 (07/13/22 1000)      Labs    Lab Results   Component Value Date/Time    HGB 10.9 (L) 07/14/2022 01:41 AM      Lab Results   Component Value Date/Time    INR 1.0 06/29/2022 09:50 AM      Lab Results   Component Value Date/Time    Sodium 139 07/14/2022 01:41 AM    Potassium 4.0 07/14/2022 01:41 AM    Chloride 107 07/14/2022 01:41 AM    CO2 25 07/14/2022 01:41 AM    Glucose 94 07/14/2022 01:41 AM    BUN 22 (H) 07/14/2022 01:41 AM    Creatinine 1.24 (H) 07/14/2022 01:41 AM    Calcium 8.4 (L) 07/14/2022 01:41 AM     Recent Glucose Results:   Lab Results   Component Value Date/Time    GLU 94 07/14/2022 01:41 AM    GLUCPOC 93 07/14/2022 05:43 AM           Body mass index is 28.61 kg/m². : A BMI > 30 is classified as obesity and > 40 is classified as morbid obesity. Aquacel dressing to left knee c.d.i  Cryotherapy in place over incision  Calves soft and supple;  No pain with passive stretch  Bilateral LEs warm, dry. 2+ DP pulses. Sensation and motor intact - PF/DF/EHL intact 5/5  Foot Pumps for mechanical DVT proph while in bed     PLAN:  1) PT: BID WBAT  2) Anticoagulation:  Aspirin 81 mg PO BID for DVT Prophylaxis. Encouraged early mobilization, bed exercises, and SCD use. 3) Pain - Multimodal approach including cryotherapy, scheduled Tylenol with  PRN  tramadol, oxycodone. Rx sent to [facility]  4) Post operative care: Encourage IS. Continue OBR. Aquacel x 7 days unless seal/integrity lost.  Follow up with Dr. Cesia Johansen in 3-4 weeks; PCP follow up (patient already scheduling for pre op lab recheck). 5) Post operative anemia: Hgb at PAT on 6/29: 12.9. EBL: 100 mL. Hgb on POD 1: 10.9. No active bleeding noted, patient asymptomatic. Expected Acute blood loss post-op anemia, continue to monitor. 6) Readniess for discharge: discharge to home today with [facility] and 's support. [x] Vital Signs stable    [x] + Voiding    [x] Wound intact, drainage minimal    [x] Tolerating PO intake     [] Cleared by PT (OT if applicable)     [] Stair training completed (if applicable)    [] Independent / Contact Guard Assist (household distance)     [] Bed mobility     [] Car transfers     [] ADLs    [x] Adequate pain control on oral medication alone     Pending PT clearance, discharge to home with [facility] and 's support.       Theodora Alex NP  Available via [employer]

## 2022-07-14 NOTE — PROGRESS NOTES
Transition of Care: Plan for discharge home with [facility]. AT [address] has accepted patient. Patient owns rolling walker. Family will transport patient home. Care Management Interventions  PCP Verified by CM: Yes  Mode of Transport at Discharge: Other (see comment) (family/car)  Transition of Care Consult (CM Consult): [address]: No  Reason Outside [place]: Physician referred to specific agency  MyChart Signup: No  Discharge Durable Medical Equipment: No  Physical Therapy Consult: Yes  Occupational Therapy Consult: Yes  Support Systems: Spouse/Significant Other  Confirm Follow Up Transport: Family  The Patient and/or Patient Representative was Provided with a Choice of Provider and Agrees with the Discharge Plan?: Yes  Freedom of Choice List was Provided with Basic Dialogue that Supports the Patient's Individualized Plan of Care/Goals, Treatment Preferences and Shares the Quality Data Associated with the Providers?: Yes  Discharge Location  Patient Expects to be Discharged to[de-identified] Home with home health    The Plan for Transition of Care is related to the following treatment goals: home health    The Patient and/or patient representative  was provided with a choice of provider and agrees   with the discharge plan. [x] Yes [] No    Freedom of choice list was provided with basic dialogue that supports the patient's individualized plan of care/goals, treatment preferences and shares the quality data associated with the providers.  [x] Yes [] No    Maggie Rajna, BSW/CRM    Maggie Rajan, BSW/CRM

## 2022-07-14 NOTE — PROGRESS NOTES
Bedside and Verbal shift change report given to Isabel Whiting RN (oncoming nurse) by Kristopher Harry RN (offgoing nurse). Report included the following information SBAR and MAR.

## 2022-07-14 NOTE — PROGRESS NOTES
Famotidine - Pharmacy Renal dose protocol:  Current regimen:  20 mg po Q 12 hr  Recent Labs     07/14/22  0141   CREA 1.24*   BUN 22*     Estimated CrCl:  39 ml/min    Plan:   Change to 20 mg po Q 24 hr with respect to creatinine clearance less than 50 mL/min per [facility]-approved Renal Dosing Adjustment protocol. Pharmacy will continue to monitor this patient daily for changes in clinical status and renal function.

## 2022-07-14 NOTE — DISCHARGE SUMMARY
Ortho Discharge Summary    Patient ID:  Fritzi Sacks  [id number]  female  74 y.o.  1950    Admit date: 7/13/2022    Discharge date: 7/14/2022    Admitting Physician: Keya Johnston MD     Consulting Physician(s):   Treatment Team: Occupational Therapist: Dewayne Leiva OT; Physical Therapist: Tato Diamond; Care Manager: Sparkle Scales    Date of Surgery:   7/13/2022     Preoperative Diagnosis:  PRIMARY OA OF LEFT KNEE    Postoperative Diagnosis:   PRIMARY OA OF LEFT KNEE    Procedure(s):   LEFT TOTAL KNEE ARTHROPLASTY (VELYS)     Anesthesia Type:   Spinal     Surgeon: Rubi Deshpande MD                            HPI:  Pt is a 74 y.o. female who has a history of PRIMARY OA OF LEFT KNEE  with pain and limitations of activities of daily living who presents at this time for a left LEFT TOTAL KNEE ARTHROPLASTY (VELYS) following the failure of conservative management. PMH:   Past Medical History:   Diagnosis Date    Cancer ([facility]     [facility] AND SCC - MULTIPLE    COVID-19 06/12/2022    Hypertension     Psychiatric disorder     ANXIETY    Rheumatoid arteritis ([facility]     Sleep apnea     CPAP       Body mass index is 28.61 kg/m². : A BMI > 30 is classified as obesity and > 40 is classified as morbid obesity. Medications upon admission :   Prior to Admission Medications   Prescriptions Last Dose Informant Patient Reported? Taking? B infantis/B ani/B katlin/B bifid (PROBIOTIC 4X PO) 7/6/2022 at Unknown time  Yes Yes   Sig: Take 1 Tablet by mouth daily. Biotin 2,500 mcg cap 7/6/2022 at Unknown time  Yes Yes   Sig: Take 5,000 mcg by mouth daily. CRANBERRY PO 7/6/2022 at Unknown time  Yes Yes   Sig: Take 1 Tablet by mouth daily. Calcium-Cholecalciferol, D3, (Calcium 600 with Vitamin D3) 600 mg(1,500mg) -400 unit chew 7/6/2022 at Unknown time  Yes Yes   Sig: Take 1 Tab by mouth.    LORazepam (ATIVAN) 0.5 mg tablet 7/13/2022 at Unknown time  Yes Yes   Sig: Take 0.5 mg by mouth every eight (8) hours as needed. TURMERIC PO 7/6/2022 at Unknown time  Yes Yes   Sig: Take 1 Tablet by mouth daily. acetaminophen (Tylenol Arthritis Pain) 650 mg TbER   Yes No   Sig: Take 1,300 mg by mouth every eight (8) hours. ascorbic acid, vitamin C, (Vitamin C) 500 mg tablet 7/6/2022 at Unknown time  Yes Yes   Sig: Take 500 mg by mouth daily. aspirin 81 mg chewable tablet 7/12/2022 at Unknown time  Yes No   Sig: Take 81 mg by mouth daily. cholecalciferol, vitamin D3, (Vitamin D3) 50 mcg (2,000 unit) tab 7/6/2022 at Unknown time  Yes Yes   Sig: Take 1 Tablet by mouth daily. glucosamine sulfate (GLUCOSAMINE PO) 7/6/2022 at Unknown time  Yes Yes   Sig: Take 1 Tablet by mouth daily. 500MG   hydroxychloroquine (PLAQUENIL) 200 mg tablet 7/13/2022 at Unknown time  Yes No   Sig: Take 200 mg by mouth daily. Indications: rheumatoid arthritis   isosorbide dinitrate (ISORDIL) 5 mg tablet 7/13/2022 at Unknown time  Yes Yes   Sig: Take 30 mg by mouth daily. losartan-hydroCHLOROthiazide (HYZAAR) 100-12.5 mg per tablet 7/12/2022 at Unknown time  Yes Yes   Sig: Take 1 Tablet by mouth nightly. mirtazapine (REMERON) 15 mg tablet   Yes No   Sig: Take 15 mg by mouth nightly. multivitamin (ONE A DAY) tablet 7/6/2022 at Unknown time  Yes Yes   Sig: Take 1 Tablet by mouth daily. omega 3-dha-epa-fish oil (Fish Oil) 100-160-1,000 mg cap 7/6/2022 at Unknown time  Yes Yes   Sig: Take 1 Cap by mouth daily. omeprazole (PRILOSEC) 40 mg capsule 7/13/2022 at Unknown time  Yes Yes   Sig: Take  by mouth daily. rosuvastatin (CRESTOR) 5 mg tablet 7/12/2022 at Unknown time  Yes Yes   Sig: Take 5 mg by mouth nightly. Facility-Administered Medications: None        Allergies: Allergies   Allergen Reactions    Ceclor [Cefaclor] Rash    Keflex [Cephalexin] Rash    Mobic [Meloxicam] Hives    Penicillamine Rash    Penicillins Rash     NO REPORTED SEVERE NON-IGE-MEDICATED REACTIONS        Hospital Course: The patient underwent surgery. Complications:  None; patient tolerated the procedure well. Was taken to the PACU in stable condition and then transferred to the ortho floor. Patient mobilized on day of surgery with therapy. Overnight in to POD 1, patient with pain control achieved using primarily tylenol and toradol. Patient again evaluated on POD 1 by therapy services with clearance for discharge to home with home health and family support . Perioperative Antibiotics:  Ancef and vancomycin     Postoperative Pain Management:  Oxycodone & Tylenol, Tramadol    DVT Prophylaxis: Aspirin 81 mg PO BID    Postoperative transfusions:    Number of units banked PRBCs =   none     Post Op complications: none    Hemoglobin at discharge:    Lab Results   Component Value Date/Time    HGB 10.9 (L) 07/14/2022 01:41 AM    INR 1.0 06/29/2022 09:50 AM       Aquacel dressing remained in place - clean, dry and intact. No significant erythema or swelling. Wound appears to be healing without any evidence of infection. Neurovascular exam found to be within normal limits. Physical Therapy started following surgery and participated in bed mobility, transfers and ambulation. Gait:  Gait  Base of Support: Widened,Shift to right  Speed/Minnie: Slow  Step Length: Right shortened  Swing Pattern: Left asymmetrical  Stance: Left decreased  Gait Abnormalities: Antalgic,Decreased step clearance,Step to gait  Ambulation - Level of Assistance: Contact guard assistance  Distance (ft): 65 Feet (ft)  Assistive Device: Walker, rolling,Gait belt  Interventions: Safety awareness training,Verbal cues            Interventions: Safety awareness training,Verbal cues      Discharged to: Home. Condition on Discharge:   stable    Discharge instructions:  - Anticoagulate with Aspirin 81 mg PO BID  - Take pain medications as prescribed  - Resume pre hospital diet      - Discharge activity: activity as tolerated  - Ambulate with assistive device as needed.   - Weight bearing status as tolerated  - Wound Care Keep wound clean and dry. See discharge instruction sheet. -DISCHARGE MEDICATION LIST     Discharge Medication List as of 7/14/2022 11:49 AM      START taking these medications    Details   acetaminophen (TYLENOL) 325 mg tablet Take 2 Tablets by mouth every six (6) hours. , Normal, Disp-100 Tablet, R-0      aspirin delayed-release 81 mg tablet Take 1 Tablet by mouth two (2) times a day for 30 days. , Normal, Disp-60 Tablet, R-0      oxyCODONE IR (ROXICODONE) 5 mg immediate release tablet Take 1 Tablet by mouth every four (4) hours as needed for Pain for up to 7 days. Max Daily Amount: 30 mg., Normal, Disp-40 Tablet, R-0      senna-docusate (PERICOLACE) 8.6-50 mg per tablet Take 1 Tablet by mouth two (2) times a day for 30 days. , Normal, Disp-60 Tablet, R-0      polyethylene glycol (MIRALAX) 17 gram packet Take 1 Packet by mouth daily for 10 days. , Normal, Disp-10 Packet, R-0      traMADoL (ULTRAM) 50 mg tablet Take 1 Tablet by mouth every six (6) hours as needed for Pain for up to 7 days. Max Daily Amount: 200 mg., Normal, Disp-28 Tablet, R-0      naloxone (Narcan) 4 mg/actuation nasal spray Use 1 spray intranasally, then discard. Repeat with new spray every 2 min as needed for opioid overdose symptoms, alternating nostrils. , Normal, Disp-1 Each, R-0      ondansetron (ZOFRAN ODT) 4 mg disintegrating tablet Take 1 Tablet by mouth every eight (8) hours as needed for Nausea or Vomiting., Normal, Disp-20 Tablet, R-0         CONTINUE these medications which have NOT CHANGED    Details   cholecalciferol, vitamin D3, (Vitamin D3) 50 mcg (2,000 unit) tab Take 1 Tablet by mouth daily. , Historical Med      isosorbide dinitrate (ISORDIL) 5 mg tablet Take 30 mg by mouth daily. , Historical Med      B infantis/B ani/B katlin/B bifid (PROBIOTIC 4X PO) Take 1 Tablet by mouth daily. , Historical Med      multivitamin (ONE A DAY) tablet Take 1 Tablet by mouth daily. , Historical Med TURMERIC PO Take 1 Tablet by mouth daily. , Historical Med      glucosamine sulfate (GLUCOSAMINE PO) Take 1 Tablet by mouth daily. 500MG, Historical Med      CRANBERRY PO Take 1 Tablet by mouth daily. , Historical Med      losartan-hydroCHLOROthiazide (HYZAAR) 100-12.5 mg per tablet Take 1 Tablet by mouth nightly., Historical Med      omeprazole (PRILOSEC) 40 mg capsule Take  by mouth daily. , Historical Med      rosuvastatin (CRESTOR) 5 mg tablet Take 5 mg by mouth nightly., Historical Med      LORazepam (ATIVAN) 0.5 mg tablet Take 0.5 mg by mouth every eight (8) hours as needed., Historical Med      Biotin 2,500 mcg cap Take 5,000 mcg by mouth daily. , Historical Med      omega 3-dha-epa-fish oil (Fish Oil) 100-160-1,000 mg cap Take 1 Cap by mouth daily. , Historical Med      Calcium-Cholecalciferol, D3, (Calcium 600 with Vitamin D3) 600 mg(1,500mg) -400 unit chew Take 1 Tab by mouth., Historical Med      ascorbic acid, vitamin C, (Vitamin C) 500 mg tablet Take 500 mg by mouth daily. , Historical Med      mirtazapine (REMERON) 15 mg tablet Take 15 mg by mouth nightly., Historical Med      hydroxychloroquine (PLAQUENIL) 200 mg tablet Take 200 mg by mouth daily.  Indications: rheumatoid arthritis, Historical Med         STOP taking these medications       acetaminophen (Tylenol Arthritis Pain) 650 mg TbER Comments:   Reason for Stopping:         aspirin 81 mg chewable tablet Comments:   Reason for Stopping:            per medical continuation form      -Follow up in office in 3-4 weeks with Dr. Nina Torres  - Follow up with PCP James Rae for lab recheck      Signed:  Brittany Osullivan NP  Orthopaedic Nurse Practitioner    7/14/2022  12:38 PM

## 2022-07-14 NOTE — PROGRESS NOTES
OCCUPATIONAL THERAPY EVALUATION/DISCHARGE  Patient: Parvin Smith (9 y.o. female)  Date: 7/14/2022  Primary Diagnosis: Primary osteoarthritis [M19.91]  Osteoarthritis of left knee, unspecified osteoarthritis type [M17.12]  Procedure(s) (LRB):  LEFT TOTAL KNEE ARTHROPLASTY (VELYS) (Left) 1 Day Post-Op   Precautions:   Fall,WBAT    ASSESSMENT  Based on the objective data described below, the patient presents with overall good performance with self care and functional mobility following admission for LTKR. She was able to progress to and from the bathroom with CG for toileting and grooming. She is demonstrating good activity tolerance overall. She is reporting increased pain with activities but overall doing well with all activities. Her  is engaged and willing/able to assist at home as needed. Pt is independent with all education provided. discussed showering for home and recommended to wait until tomorrow for showering. Pt has no further needs for OT intervention. Current Level of Function (ADLs/self-care): supervision to CG    Functional Outcome Measure: The patient scored 70 on the Barthel Index outcome measure which is indicative of 30% impairment with ADL activities. Other factors to consider for discharge: debility      PLAN :  Recommend with staff: OOB to chair TID for meals. Recommend progression to and from bathroom with use of walker and gait belt for toileting. Recommendation for discharge: (in order for the patient to meet his/her long term goals)  No skilled occupational therapy/ follow up rehabilitation needs identified at this time. This discharge recommendation:  Has been made in collaboration with the attending provider and/or case management    IF patient discharges home will need the following DME: none       SUBJECTIVE:   Patient stated I am feeling alright.     OBJECTIVE DATA SUMMARY:   HISTORY:   Past Medical History:   Diagnosis Date    [facility] AND SCC - MULTIPLE    COVID-19 06/12/2022    Hypertension     Psychiatric disorder     ANXIETY    [facility]     Sleep apnea     CPAP     Past Surgical History:   Procedure Laterality Date    HX APPENDECTOMY  1963    HX CATARACT REMOVAL Right     HX COLONOSCOPY      HX ENDOSCOPY      HX HEART CATHETERIZATION  2020    NO STENT       Prior Level of Function/Environment/Context: pt is independent at home prior to sugery. Expanded or extensive additional review of patient history:   Home Situation  Home Environment: Private residence  # Steps to Enter: 2  Rails to Enter: Yes  Hand Rails : Right  Wheelchair Ramp: No  One/Two Story Residence: Two story, live on 1st floor  # of Interior Steps: 13  Living Alone: No  Support Systems: Spouse/Significant Other  Patient Expects to be Discharged to[de-identified] Home with family assistance  Current DME Used/Available at Home: Cane, straight,Walker, rolling  Tub or Shower Type: Shower    Hand dominance: Right    EXAMINATION OF PERFORMANCE DEFICITS:  Cognitive/Behavioral Status:  Neurologic State: Alert  Orientation Level: Oriented X4  Cognition: Appropriate for age attention/concentration  Perception: Appears intact  Perseveration: No perseveration noted  Safety/Judgement: Good awareness of safety precautions    Skin: see nursing notes; Aquacel in place but noted with rolling down at the proximal end    Edema: none noted    Hearing: Auditory  Auditory Impairment: None    Vision/Perceptual:                           Acuity: Within Defined Limits         Range of Motion:    AROM: Within functional limits  PROM: Within functional limits                      Strength:    Strength: Within functional limits                Coordination:  Coordination: Within functional limits  Fine Motor Skills-Upper: Right Intact; Left Intact    Gross Motor Skills-Upper: Right Intact; Left Intact    Tone & Sensation:    Tone: Normal  Sensation: Intact Balance:  Sitting: Intact  Standing: Impaired; With support  Standing - Static: Good;Constant support  Standing - Dynamic : Good;Constant support    Functional Mobility and Transfers for ADLs:  Bed Mobility:  Supine to Sit: Contact guard assistance  Sit to Supine: Contact guard assistance    Transfers:  Sit to Stand: Contact guard assistance  Stand to Sit: Contact guard assistance  Bed to Chair: Contact guard assistance  Bathroom Mobility: Contact guard assistance  Toilet Transfer : Contact guard assistance    ADL Assessment:  Feeding: Independent    Oral Facial Hygiene/Grooming: Contact guard assistance    Bathing: Minimum assistance    Type of Bath: Chlorhexidine (CHG)    Upper Body Dressing: Supervision    Lower Body Dressing: Minimum assistance    Toileting: Contact guard assistance                ADL Intervention and task modifications:     Dressing training following TKR:  Pt participated with ADL routine while sitting in the chair. Pt provided setup for sponge bathing and given instructions for safety and energy conservation with bathing activities. Pt completed lower body bathing with min A simulated with sitting in the chair. Pt provided training and education for donning clothing over surgical leg first.  Pt completed donning underpants with supervision, pants with supervision, socks with min A, and shoes with min A using a long shoe horn. Pt did well with eduction and training but will benefit from reinforcement of education provided. Following intervention, pt left sitting in the chair. Grooming and toileting activities:  Pt completed toileting and grooming standing at the sink with CG using RW.                  Type of Bath: Chlorhexidine (CHG)                        Cognitive Retraining  Safety/Judgement: Good awareness of safety precautions    Functional Measure:    Barthel Index:  Bathin  Bladder: 10  Bowels: 10  Groomin  Dressin  Feeding: 10  Mobility: 10  Stairs: 5  Toilet Use: 5  Transfer (Bed to Chair and Back): 10  Total: 70/100      The Barthel ADL Index: Guidelines  1. The index should be used as a record of what a patient does, not as a record of what a patient could do. 2. The main aim is to establish degree of independence from any help, physical or verbal, however minor and for whatever reason. 3. The need for supervision renders the patient not independent. 4. A patient's performance should be established using the best available evidence. Asking the patient, friends/relatives and nurses are the usual sources, but direct observation and common sense are also important. However direct testing is not needed. 5. Usually the patient's performance over the preceding 24-48 hours is important, but occasionally longer periods will be relevant. 6. Middle categories imply that the patient supplies over 50 per cent of the effort. 7. Use of aids to be independent is allowed. Score Interpretation (from [address] [address])    Independent   60-79 Minimally independent   40-59 Partially dependent   20-39 Very dependent   <20 Totally dependent     -Tony Nix., Barthel, D.W. (1965). Functional evaluation: the Barthel Index. [address] [facility] ([address] (1997). The Barthel activities of daily living index: self-reporting versus actual performance in the old (> or = 75 years). Journal of [address] [place] [address] 45(7), [facility], J.DEANGELO, Ayah Stinson., [facility]. (1999). Measuring the change in disability after inpatient rehabilitation; comparison of the responsiveness of the Barthel Index and Functional [place] Measure. Journal of Neurology, Neurosurgery, and Psychiatry, 66(4), 901-860. Isabel Bridges, N.J.A, ODIN Gutierrez, & MAGGIE CobosA. (2004) Assessment of post-stroke quality of life in cost-effectiveness studies: The usefulness of the Barthel Index and the EuroQoL-5D.  [facility], 13, 190-83 Occupational Therapy Evaluation Charge Determination   History Examination Decision-Making   LOW Complexity : Brief history review  LOW Complexity : 1-3 performance deficits relating to physical, cognitive , or psychosocial skils that result in activity limitations and / or participation restrictions  LOW Complexity : No comorbidities that affect functional and no verbal or physical assistance needed to complete eval tasks       Based on the above components, the patient evaluation is determined to be of the following complexity level: LOW   Pain Ratin/10 pain in the knee    Activity Tolerance:   Good    After treatment patient left in no apparent distress:    Sitting in chair and Call bell within reach    COMMUNICATION/EDUCATION:   The patients plan of care was discussed with: Physical therapist and Registered nurse.      Thank you for this referral.  Heidi Dunbar OT  Time Calculation: 29 mins

## 2022-07-14 NOTE — NURSE NAVIGATOR
[address]  [facility] Orthopaedic Pathway Handoff     FROM:                                TO: At [address]                                                      ([address] [place] [address] or Facility name)  Barbara Wolfe 53  [address] [place] [address]  Dept: [address] [facility] [address]: [phone]                                      Room#:  639/88                                                       Nurse Navigator:  Yulisa Santos RN         SITUATION      ASAScore: ASA 3 - Patient with moderate systemic disease with functional limitations    Admitted:  7/13/2022  Hospital Day: 2      Attending Provider:  No att. providers found     Consultations:  None    PCP:  Julissa De La Cruz MD   [phone]     Admitting Dx:  Primary osteoarthritis [M19.91]  Osteoarthritis of left knee, unspecified osteoarthritis type [M17.12]       Active Problems:    Primary osteoarthritis (7/13/2022)      Osteoarthritis of left knee (7/13/2022)      1 Day Post-Op of   Procedure(s):  LEFT TOTAL KNEE ARTHROPLASTY (VELYS)   BY: Camila Asencio MD             ON: 7/13/2022                  Code Status: Full Code             Advance Directive? Yes Not W Pt (Send w/patient)     Isolation:  There are currently no Active Isolations       MDRO: No current active infections    BACKGROUND     Allergies:   Allergies   Allergen Reactions    Ceclor [Cefaclor] Rash    Keflex [Cephalexin] Rash    Mobic [Meloxicam] Hives    Penicillamine Rash    Penicillins Rash     NO REPORTED SEVERE NON-IGE-MEDICATED REACTIONS       Past Medical History:   Diagnosis Date    Cancer ([facility] [address]     [facility] - MULTIPLE    COVID-19 06/12/2022    Hypertension     Psychiatric disorder     ANXIETY    Rheumatoid arteritis ([facility] [address]     Sleep apnea     CPAP       Past Surgical History:   Procedure Laterality Date    HX APPENDECTOMY  1963    HX CATARACT REMOVAL Right     HX COLONOSCOPY      HX ENDOSCOPY      HX HEART CATHETERIZATION 2020    NO STENT       Prior to Admission Medications   Prescriptions Last Dose Informant Patient Reported? Taking? B infantis/B ani/B katlin/B bifid (PROBIOTIC 4X PO) 7/6/2022 at Unknown time  Yes Yes   Sig: Take 1 Tablet by mouth daily. Biotin 2,500 mcg cap 7/6/2022 at Unknown time  Yes Yes   Sig: Take 5,000 mcg by mouth daily. CRANBERRY PO 7/6/2022 at Unknown time  Yes Yes   Sig: Take 1 Tablet by mouth daily. Calcium-Cholecalciferol, D3, (Calcium 600 with Vitamin D3) 600 mg(1,500mg) -400 unit chew 7/6/2022 at Unknown time  Yes Yes   Sig: Take 1 Tab by mouth. LORazepam (ATIVAN) 0.5 mg tablet 7/13/2022 at Unknown time  Yes Yes   Sig: Take 0.5 mg by mouth every eight (8) hours as needed. TURMERIC PO 7/6/2022 at Unknown time  Yes Yes   Sig: Take 1 Tablet by mouth daily. acetaminophen (Tylenol Arthritis Pain) 650 mg TbER   Yes No   Sig: Take 1,300 mg by mouth every eight (8) hours. ascorbic acid, vitamin C, (Vitamin C) 500 mg tablet 7/6/2022 at Unknown time  Yes Yes   Sig: Take 500 mg by mouth daily. aspirin 81 mg chewable tablet 7/12/2022 at Unknown time  Yes No   Sig: Take 81 mg by mouth daily. cholecalciferol, vitamin D3, (Vitamin D3) 50 mcg (2,000 unit) tab 7/6/2022 at Unknown time  Yes Yes   Sig: Take 1 Tablet by mouth daily. glucosamine sulfate (GLUCOSAMINE PO) 7/6/2022 at Unknown time  Yes Yes   Sig: Take 1 Tablet by mouth daily. 500MG   hydroxychloroquine (PLAQUENIL) 200 mg tablet 7/13/2022 at Unknown time  Yes No   Sig: Take 200 mg by mouth daily. Indications: rheumatoid arthritis   isosorbide dinitrate (ISORDIL) 5 mg tablet 7/13/2022 at Unknown time  Yes Yes   Sig: Take 30 mg by mouth daily. losartan-hydroCHLOROthiazide (HYZAAR) 100-12.5 mg per tablet 7/12/2022 at Unknown time  Yes Yes   Sig: Take 1 Tablet by mouth nightly. mirtazapine (REMERON) 15 mg tablet   Yes No   Sig: Take 15 mg by mouth nightly.    multivitamin (ONE A DAY) tablet 7/6/2022 at Unknown time  Yes Yes   Sig: Take 1 Tablet by mouth daily. omega 3-dha-epa-fish oil (Fish Oil) 100-160-1,000 mg cap 7/6/2022 at Unknown time  Yes Yes   Sig: Take 1 Cap by mouth daily. omeprazole (PRILOSEC) 40 mg capsule 7/13/2022 at Unknown time  Yes Yes   Sig: Take  by mouth daily. rosuvastatin (CRESTOR) 5 mg tablet 7/12/2022 at Unknown time  Yes Yes   Sig: Take 5 mg by mouth nightly. Facility-Administered Medications: None       Vaccinations: There is no immunization history on file for this patient. ASSESSMENT   Age: 74 y.o. Gender: female        Height: Height: 5' 4\" (162.6 cm)                    Weight:Weight: 75.6 kg (166 lb 10.7 oz)     Patient Vitals for the past 8 hrs:   Temp Pulse Resp BP SpO2   07/14/22 0837 98.5 °F (36.9 °C) 70 16 136/71 95 %            Active Orders   Diet    ADULT DIET Regular       Orientation: Orientation Level: Oriented X4    Active Lines/Drains:  (Peg Tube / Alvarez / CL or S/L?):no    Urinary Status: Voiding      Last BM: Last Bowel Movement Date: 07/13/22     Skin Integrity: Incision (comment) (L knee)             Mobility: Slightly limited   Weight Bearing Status: WBAT (Weight Bearing as Tolerated)      Gait Training  Assistive Device: Walker, rolling,Gait belt  Ambulation - Level of Assistance: Contact guard assistance  Distance (ft): 65 Feet (ft)  Interventions: Safety awareness training,Verbal cues     On Anticoagulation?  YES  Aspirin                                         Pain Medications given:  Oxycodone; tramadol                                   Lab Results   Component Value Date/Time    Glucose 94 07/14/2022 01:41 AM    Hemoglobin A1c 5.7 (H) 06/29/2022 09:50 AM    INR 1.0 06/29/2022 09:50 AM    HGB 10.9 (L) 07/14/2022 01:41 AM    HGB 12.9 06/29/2022 09:50 AM    HGB 13.0 05/21/2020 10:30 AM       Readmission Risks:  Score:         RECOMMENDATION     See After Visit Summary (AVS) for:  · Discharge instructions  · After [address]   · Medication Reconciliation [facility] Orthopaedic Nurse Navigator  TAO Parmar, RN-BC       Office  [phone]  Cell      [phone]  Fax      [phone]  [email or web address]             . [email or web address]

## 2022-07-14 NOTE — PROGRESS NOTES
Problem: Falls - Risk of  Goal: *Absence of Falls  Description: Document [place] Flow Fall Risk and appropriate interventions in the flowsheet.   Outcome: Progressing Towards Goal  Note: Fall Risk Interventions:  Mobility Interventions: Bed/chair exit alarm,Patient to call before getting OOB         Medication Interventions: Patient to call before getting OOB    Elimination Interventions: Call light in reach    History of Falls Interventions: Bed/chair exit alarm         Problem: Patient Education: Go to Patient Education Activity  Goal: Patient/Family Education  Outcome: Progressing Towards Goal     Problem: Patient Education: Go to Patient Education Activity  Goal: Patient/Family Education  Outcome: Progressing Towards Goal     Problem: Patient Education: Go to Patient Education Activity  Goal: Patient/Family Education  Outcome: Progressing Towards Goal     Problem: Knee Replacement: Day of Surgery/Unit  Goal: Off Pathway (Use only if patient is Off Pathway)  Outcome: Progressing Towards Goal  Goal: Activity/Safety  Outcome: Progressing Towards Goal  Goal: Consults, if ordered  Outcome: Progressing Towards Goal  Goal: Diagnostic Test/Procedures  Outcome: Progressing Towards Goal  Goal: Nutrition/Diet  Outcome: Progressing Towards Goal  Goal: Medications  Outcome: Progressing Towards Goal  Goal: Respiratory  Outcome: Progressing Towards Goal  Goal: Treatments/Interventions/Procedures  Outcome: Progressing Towards Goal  Goal: Psychosocial  Outcome: Progressing Towards Goal  Goal: *Initiate mobility  Outcome: Progressing Towards Goal  Goal: *Optimal pain control at patient's stated goal  Outcome: Progressing Towards Goal  Goal: *Hemodynamically stable  Outcome: Progressing Towards Goal     Problem: Knee Replacement: Post-Op Day 1  Goal: Off Pathway (Use only if patient is Off Pathway)  Outcome: Progressing Towards Goal  Goal: Activity/Safety  Outcome: Progressing Towards Goal  Goal: Diagnostic Test/Procedures  Outcome: Progressing Towards Goal  Goal: Nutrition/Diet  Outcome: Progressing Towards Goal  Goal: Medications  Outcome: Progressing Towards Goal  Goal: Respiratory  Outcome: Progressing Towards Goal  Goal: Treatments/Interventions/Procedures  Outcome: Progressing Towards Goal  Goal: Psychosocial  Outcome: Progressing Towards Goal  Goal: Discharge Planning  Outcome: Progressing Towards Goal  Goal: *Demonstrates progressive activity  Outcome: Progressing Towards Goal  Goal: *Optimal pain control at patient's stated goal  Outcome: Progressing Towards Goal  Goal: *Hemodynamically stable  Outcome: Progressing Towards Goal  Goal: *Discharge plan identified  Outcome: Progressing Towards Goal     Problem: Knee Replacement: Post-Op Day 2  Goal: Off Pathway (Use only if patient is Off Pathway)  Outcome: Progressing Towards Goal  Goal: Activity/Safety  Outcome: Progressing Towards Goal  Goal: Diagnostic Test/Procedures  Outcome: Progressing Towards Goal  Goal: Medications  Outcome: Progressing Towards Goal  Goal: Respiratory  Outcome: Progressing Towards Goal  Goal: Treatments/Interventions/Procedures  Outcome: Progressing Towards Goal  Goal: Psychosocial  Outcome: Progressing Towards Goal  Goal: *Met physical therapy criteria for discharge to the next level of care  Outcome: Progressing Towards Goal  Goal: *Optimal pain control with oral analgesia  Outcome: Progressing Towards Goal  Goal: *Hemodynamically stable  Outcome: Progressing Towards Goal  Goal: *Tolerating diet  Outcome: Progressing Towards Goal  Goal: *Patient verbalizes understanding of discharge instructions  Outcome: Progressing Towards Goal

## 2022-08-09 ENCOUNTER — OFFICE VISIT (OUTPATIENT)
Dept: ORTHOPEDIC SURGERY | Age: 72
End: 2022-08-09
Payer: MEDICARE

## 2022-08-09 VITALS — WEIGHT: 166 LBS | BODY MASS INDEX: 28.34 KG/M2 | HEIGHT: 64 IN

## 2022-08-09 DIAGNOSIS — Z96.652 STATUS POST LEFT KNEE REPLACEMENT: Primary | ICD-10-CM

## 2022-08-09 DIAGNOSIS — M17.11 ARTHRITIS OF KNEE, RIGHT: ICD-10-CM

## 2022-08-09 PROCEDURE — 20610 DRAIN/INJ JOINT/BURSA W/O US: CPT | Performed by: ORTHOPAEDIC SURGERY

## 2022-08-09 PROCEDURE — G8400 PT W/DXA NO RESULTS DOC: HCPCS | Performed by: ORTHOPAEDIC SURGERY

## 2022-08-09 PROCEDURE — 1090F PRES/ABSN URINE INCON ASSESS: CPT | Performed by: ORTHOPAEDIC SURGERY

## 2022-08-09 PROCEDURE — 1101F PT FALLS ASSESS-DOCD LE1/YR: CPT | Performed by: ORTHOPAEDIC SURGERY

## 2022-08-09 PROCEDURE — G8427 DOCREV CUR MEDS BY ELIG CLIN: HCPCS | Performed by: ORTHOPAEDIC SURGERY

## 2022-08-09 PROCEDURE — 3017F COLORECTAL CA SCREEN DOC REV: CPT | Performed by: ORTHOPAEDIC SURGERY

## 2022-08-09 PROCEDURE — G8419 CALC BMI OUT NRM PARAM NOF/U: HCPCS | Performed by: ORTHOPAEDIC SURGERY

## 2022-08-09 PROCEDURE — 99213 OFFICE O/P EST LOW 20 MIN: CPT | Performed by: ORTHOPAEDIC SURGERY

## 2022-08-09 PROCEDURE — 1123F ACP DISCUSS/DSCN MKR DOCD: CPT | Performed by: ORTHOPAEDIC SURGERY

## 2022-08-09 PROCEDURE — G8432 DEP SCR NOT DOC, RNG: HCPCS | Performed by: ORTHOPAEDIC SURGERY

## 2022-08-09 PROCEDURE — G8536 NO DOC ELDER MAL SCRN: HCPCS | Performed by: ORTHOPAEDIC SURGERY

## 2022-08-09 RX ORDER — METHYLPREDNISOLONE 4 MG/1
TABLET ORAL
Qty: 1 DOSE PACK | Refills: 0 | Status: SHIPPED | OUTPATIENT
Start: 2022-08-09

## 2022-08-09 RX ORDER — TRIAMCINOLONE ACETONIDE 40 MG/ML
40 INJECTION, SUSPENSION INTRA-ARTICULAR; INTRAMUSCULAR ONCE
Status: COMPLETED | OUTPATIENT
Start: 2022-08-09 | End: 2022-08-09

## 2022-08-09 RX ADMIN — TRIAMCINOLONE ACETONIDE 40 MG: 40 INJECTION, SUSPENSION INTRA-ARTICULAR; INTRAMUSCULAR at 13:51

## 2022-08-09 NOTE — PROGRESS NOTES
Francisco Jimenez (: 1950) is a 74 y.o. female patient, here for evaluation of the following chief complaint(s):  Surgical Follow-up (Left knee follow up/)       ASSESSMENT/PLAN:  Below is the assessment and plan developed based on review of pertinent history, physical exam, labs, studies, and medications. Radiographs reviewed including 3 views of the left knee. Status post left knee arthroplasty. No evidence of aseptic loosening. Overall alignment is appropriate. Patella tracking centrally. Assessment and plan: Status post left knee arthroplasty. The patient is very happy with  progress and is doing well. She has significant right knee osteoarthritis. She has mild swelling. X-rays reveal severe tricompartmental knee osteoarthritis. She requested an injection today. I injected 40 mg triamcinolone into her right knee joint using sterile technique. I also injected hyaluronic acid, gel 1 into her right knee joint using sterile technique. She tolerated well. Plan for follow-up in 6 weeks      1. Status post left knee replacement  -     XR KNEE LT 3 V; Future  2. Arthritis of knee, right  -     DRAIN/INJECT LARGE JOINT/BURSA      Encounter Diagnoses   Name Primary? Status post left knee replacement Yes    Arthritis of knee, right         No follow-ups on file. SUBJECTIVE/OBJECTIVE:  Francisco Jimenez (: 1950) is a 74 y.o. female who presents today for the following:  Chief Complaint   Patient presents with    Surgical Follow-up     Left knee follow up         Status post left total knee. Left total knee is doing great. She is 4 weeks out. Range of motion 0-1 12. Her pain is minimal.  She only takes Tylenol. She is very happy with her progress. She is starting outpatient therapy tomorrow    IMAGING:  XR Results (most recent):  Results from Appointment encounter on 22    XR KNEE LT 3 V    Narrative  3 views left knee ordered and independently reviewed.   Status post total knee. Well fixed. No complications. She has end-stage right knee osteoarthritis       Allergies   Allergen Reactions    Ceclor [Cefaclor] Rash    Keflex [Cephalexin] Rash    Mobic [Meloxicam] Hives    Penicillamine Rash    Penicillins Rash     NO REPORTED SEVERE NON-IGE-MEDICATED REACTIONS       Current Outpatient Medications   Medication Sig    acetaminophen (TYLENOL) 325 mg tablet Take 2 Tablets by mouth every six (6) hours. aspirin delayed-release 81 mg tablet Take 1 Tablet by mouth two (2) times a day for 30 days. cholecalciferol, vitamin D3, 50 mcg (2,000 unit) tab Take 1 Tablet by mouth daily. isosorbide dinitrate (ISORDIL) 5 mg tablet Take 30 mg by mouth daily. B infantis/B ani/B katlin/B bifid (PROBIOTIC 4X PO) Take 1 Tablet by mouth daily. multivitamin (ONE A DAY) tablet Take 1 Tablet by mouth daily. TURMERIC PO Take 1 Tablet by mouth daily. glucosamine sulfate (GLUCOSAMINE PO) Take 1 Tablet by mouth daily. 500MG    CRANBERRY PO Take 1 Tablet by mouth daily. losartan-hydroCHLOROthiazide (HYZAAR) 100-12.5 mg per tablet Take 1 Tablet by mouth nightly. omeprazole (PRILOSEC) 40 mg capsule Take  by mouth daily. mirtazapine (REMERON) 15 mg tablet Take 15 mg by mouth nightly. rosuvastatin (CRESTOR) 5 mg tablet Take 5 mg by mouth nightly. LORazepam (ATIVAN) 0.5 mg tablet Take 0.5 mg by mouth every eight (8) hours as needed. Biotin 2,500 mcg cap Take 5,000 mcg by mouth daily. omega 3-dha-epa-fish oil (Fish Oil) 100-160-1,000 mg cap Take 1 Cap by mouth daily. Calcium-Cholecalciferol, D3, (Calcium 600 with Vitamin D3) 600 mg(1,500mg) -400 unit chew Take 1 Tab by mouth. ascorbic acid, vitamin C, (VITAMIN C) 500 mg tablet Take 500 mg by mouth daily. senna-docusate (PERICOLACE) 8.6-50 mg per tablet Take 1 Tablet by mouth two (2) times a day for 30 days. naloxone (Narcan) 4 mg/actuation nasal spray Use 1 spray intranasally, then discard.  Repeat with new spray every 2 min as needed for opioid overdose symptoms, alternating nostrils. ondansetron (ZOFRAN ODT) 4 mg disintegrating tablet Take 1 Tablet by mouth every eight (8) hours as needed for Nausea or Vomiting. Current Facility-Administered Medications   Medication    triamcinolone acetonide (KENALOG-40) 40 mg/mL injection 40 mg    hyaluronate sodium, cross-linked (GEL-ONE) injection syrg 30 mg       Past Medical History:   Diagnosis Date    Cancer ([facility]     [facility] - MULTIPLE    COVID-19 2022    Hypertension     Psychiatric disorder     ANXIETY    Rheumatoid arteritis ([facility]     Sleep apnea     CPAP        Past Surgical History:   Procedure Laterality Date    HX APPENDECTOMY  1963    HX CATARACT REMOVAL Right     HX COLONOSCOPY      HX ENDOSCOPY      HX HEART CATHETERIZATION      NO STENT       Family History   Problem Relation Age of Onset    Heart Disease Mother     Cancer Father         LUNG    No Known Problems Sister     Anesth Problems Neg Hx         Social History     Tobacco Use    Smoking status: Former     Packs/day: 0.50     Years: 5.00     Pack years: 2.50     Types: Cigarettes     Quit date: 1973     Years since quittin.1    Smokeless tobacco: Never   Substance Use Topics    Alcohol use: Not Currently        All systems reviewed x 12 and were negative with the exception of None      No flowsheet data found. Vitals:  Ht 5' 4\" (1.626 m)   Wt 166 lb (75.3 kg)   BMI 28.49 kg/m²    Body mass index is 28.49 kg/m². Physical Exam    Gen: NAD    Resp: Non-labored    LLE: Midline incision is healing well with no evidence of infection. No erythema. Range of motion 0-112°. No extensor lag. Grossly stable in the coronal and sagittal planes. No calf tenderness. No evidence of a DVT. Motor grossly intact. Sensation intact to light reilly throughout. Palpable pedal pulses. An electronic signature was used to authenticate this note.   -- Nancy House MD

## 2022-09-07 ENCOUNTER — TRANSCRIBE ORDER (OUTPATIENT)
Dept: SCHEDULING | Age: 72
End: 2022-09-07

## 2022-09-07 DIAGNOSIS — R13.19 ESOPHAGEAL DYSPHAGIA: ICD-10-CM

## 2022-09-07 DIAGNOSIS — R11.0 NAUSEA: ICD-10-CM

## 2022-09-07 DIAGNOSIS — K21.9 GERD (GASTROESOPHAGEAL REFLUX DISEASE): Primary | ICD-10-CM

## 2022-09-22 ENCOUNTER — OFFICE VISIT (OUTPATIENT)
Dept: ORTHOPEDIC SURGERY | Age: 72
End: 2022-09-22
Payer: MEDICARE

## 2022-09-22 VITALS — BODY MASS INDEX: 28.34 KG/M2 | HEIGHT: 64 IN | WEIGHT: 166 LBS

## 2022-09-22 DIAGNOSIS — Z98.890 STATUS POST KNEE SURGERY: Primary | ICD-10-CM

## 2022-09-22 DIAGNOSIS — M17.11 ARTHRITIS OF RIGHT KNEE: ICD-10-CM

## 2022-09-22 PROCEDURE — G8432 DEP SCR NOT DOC, RNG: HCPCS | Performed by: ORTHOPAEDIC SURGERY

## 2022-09-22 PROCEDURE — G8417 CALC BMI ABV UP PARAM F/U: HCPCS | Performed by: ORTHOPAEDIC SURGERY

## 2022-09-22 PROCEDURE — G8536 NO DOC ELDER MAL SCRN: HCPCS | Performed by: ORTHOPAEDIC SURGERY

## 2022-09-22 PROCEDURE — 1090F PRES/ABSN URINE INCON ASSESS: CPT | Performed by: ORTHOPAEDIC SURGERY

## 2022-09-22 PROCEDURE — 3017F COLORECTAL CA SCREEN DOC REV: CPT | Performed by: ORTHOPAEDIC SURGERY

## 2022-09-22 PROCEDURE — 1101F PT FALLS ASSESS-DOCD LE1/YR: CPT | Performed by: ORTHOPAEDIC SURGERY

## 2022-09-22 PROCEDURE — 99213 OFFICE O/P EST LOW 20 MIN: CPT | Performed by: ORTHOPAEDIC SURGERY

## 2022-09-22 PROCEDURE — G8427 DOCREV CUR MEDS BY ELIG CLIN: HCPCS | Performed by: ORTHOPAEDIC SURGERY

## 2022-09-22 PROCEDURE — 1123F ACP DISCUSS/DSCN MKR DOCD: CPT | Performed by: ORTHOPAEDIC SURGERY

## 2022-09-22 PROCEDURE — G8400 PT W/DXA NO RESULTS DOC: HCPCS | Performed by: ORTHOPAEDIC SURGERY

## 2022-09-22 NOTE — Clinical Note
Catheter used: Pigtail (angled). Catheter removed. I called and spoke with Mr. Vasquez Castro regarding his repeat arterial duplex of his known LEFT femoral artery pseudoaneurysm.      He is doing well after the cardiac procedure to place a pacer.  He feels much better.  He reports that his left groin lump is smaller, otherwise, no other complaints or issues.  His repeat arterial duplex shows a thrombosed pseudoaneurysm.  The patient is now back on coumadin.  I will repeat a duplex in 2 weeks to reassess for recanalization on coumadin.  I will see him in clinic after the US to assess his groin.  Mr. Castro understood and agreed to the plans.      Dr. Rajan Whyte, DO  Vascular/Endovascular Surgery  [facility]

## 2022-09-22 NOTE — PROGRESS NOTES
Francisco Jimenez (: 1950) is a 74 y.o. female patient, here for evaluation of the following chief complaint(s):  Surgical Follow-up (Left knee follow )       ASSESSMENT/PLAN:  Below is the assessment and plan developed based on review of pertinent history, physical exam, labs, studies, and medications. Radiographs reviewed including 3 views of the left knee. Status post left knee arthroplasty. No evidence of aseptic loosening. Overall alignment is appropriate. Patella tracking centrally. Assessment and plan: Status post left knee arthroplasty on 2022. The patient is very happy with  progress and is doing well. Has reached greater than 130 degrees flexion. Incision well-healed. She is very happy and pleased with her progress and outcomes thus far. Main complaint today is the ongoing pain in her right knee. She has severe tricompartmental knee osteoarthritis. About a month ago she received a gel steroid combination injection. States helped for couple weeks but symptoms have since returned. Plans to monitor symptoms and follow-up for either repeat steroid injection this fall or to further discuss total knee replacement. Follow-up sooner as needed. In regards to her left knee plans to follow-up at her 1 year mulu or sooner as needed. 1. Status post knee surgery  2. Arthritis of right knee      Encounter Diagnoses   Name Primary? Arthritis of right knee     Status post knee surgery Yes        No follow-ups on file. SUBJECTIVE/OBJECTIVE:  Francisco Jimenez (: 1950) is a 74 y.o. female who presents today for the following:  Chief Complaint   Patient presents with    Surgical Follow-up     Left knee follow        68-year-old female comes in today for follow-up. Left knee she has mild to moderate stiffness in the morning. She has mild pain with twisting pivoting/going up and down stairs. She has no pain with fully straightening her knee.   Right knee has severe pain throughout the day. IMAGING:  XR Results (most recent):  Results from Appointment encounter on 08/09/22    XR KNEE LT 3 V    Narrative  3 views left knee ordered and independently reviewed. Status post total knee. Well fixed. No complications. She has end-stage right knee osteoarthritis       Allergies   Allergen Reactions    Ceclor [Cefaclor] Rash    Keflex [Cephalexin] Rash    Mobic [Meloxicam] Hives    Penicillamine Rash    Penicillins Rash     NO REPORTED SEVERE NON-IGE-MEDICATED REACTIONS       Current Outpatient Medications   Medication Sig    methylPREDNISolone (MEDROL DOSEPACK) 4 mg tablet Per dose pack instructions    acetaminophen (TYLENOL) 325 mg tablet Take 2 Tablets by mouth every six (6) hours. cholecalciferol, vitamin D3, 50 mcg (2,000 unit) tab Take 1 Tablet by mouth daily. isosorbide dinitrate (ISORDIL) 5 mg tablet Take 30 mg by mouth daily. B infantis/B ani/B katlin/B bifid (PROBIOTIC 4X PO) Take 1 Tablet by mouth daily. multivitamin (ONE A DAY) tablet Take 1 Tablet by mouth daily. TURMERIC PO Take 1 Tablet by mouth daily. glucosamine sulfate (GLUCOSAMINE PO) Take 1 Tablet by mouth daily. 500MG    CRANBERRY PO Take 1 Tablet by mouth daily. losartan-hydroCHLOROthiazide (HYZAAR) 100-12.5 mg per tablet Take 1 Tablet by mouth nightly. omeprazole (PRILOSEC) 40 mg capsule Take  by mouth daily. mirtazapine (REMERON) 15 mg tablet Take 15 mg by mouth nightly. rosuvastatin (CRESTOR) 5 mg tablet Take 5 mg by mouth nightly. LORazepam (ATIVAN) 0.5 mg tablet Take 0.5 mg by mouth every eight (8) hours as needed. Biotin 2,500 mcg cap Take 5,000 mcg by mouth daily. omega 3-dha-epa-fish oil (Fish Oil) 100-160-1,000 mg cap Take 1 Cap by mouth daily. Calcium-Cholecalciferol, D3, (Calcium 600 with Vitamin D3) 600 mg(1,500mg) -400 unit chew Take 1 Tab by mouth. ascorbic acid, vitamin C, (VITAMIN C) 500 mg tablet Take 500 mg by mouth daily.     naloxone (Narcan) 4 mg/actuation nasal spray Use 1 spray intranasally, then discard. Repeat with new spray every 2 min as needed for opioid overdose symptoms, alternating nostrils. ondansetron (ZOFRAN ODT) 4 mg disintegrating tablet Take 1 Tablet by mouth every eight (8) hours as needed for Nausea or Vomiting. No current facility-administered medications for this visit. Past Medical History:   Diagnosis Date    Cancer ([facility]     [facility] AND [facility] - MULTIPLE    COVID-19 2022    Hypertension     Psychiatric disorder     ANXIETY    Rheumatoid arteritis ([facility]     Sleep apnea     CPAP        Past Surgical History:   Procedure Laterality Date    HX APPENDECTOMY      HX CATARACT REMOVAL Right     HX COLONOSCOPY      HX ENDOSCOPY      HX HEART CATHETERIZATION      NO STENT       Family History   Problem Relation Age of Onset    Heart Disease Mother     Cancer Father         LUNG    No Known Problems Sister     Anesth Problems Neg Hx         Social History     Tobacco Use    Smoking status: Former     Packs/day: 0.50     Years: 5.00     Pack years: 2.50     Types: Cigarettes     Quit date: 1973     Years since quittin.2    Smokeless tobacco: Never   Substance Use Topics    Alcohol use: Not Currently        All systems reviewed x 12 and were negative with the exception of None      No flowsheet data found. Vitals:  Ht 5' 4\" (1.626 m)   Wt 166 lb (75.3 kg)   BMI 28.49 kg/m²    Body mass index is 28.49 kg/m². Physical Exam    Gen: NAD    Resp: Non-labored    LLE: Midline incision is well-healed. No erythema. Range of motion 0-120°. No extensor lag. Grossly stable in the coronal and sagittal planes. No calf tenderness. No evidence of a DVT. Motor grossly intact. Sensation intact to light touch throughout. Palpable pedal pulses. Deja Alvarenga M.D. was available for immediate consultation as the supervising physician. An electronic signature was used to authenticate this note.   -- Gilda [place] Miguel Gomez PA-C

## 2022-11-03 ENCOUNTER — HOSPITAL ENCOUNTER (OUTPATIENT)
Dept: NUCLEAR MEDICINE | Age: 72
Discharge: HOME | End: 2022-11-03
Attending: INTERNAL MEDICINE
Payer: MEDICARE

## 2022-11-03 DIAGNOSIS — R11.0 NAUSEA: ICD-10-CM

## 2022-11-03 DIAGNOSIS — K21.9 GERD (GASTROESOPHAGEAL REFLUX DISEASE): ICD-10-CM

## 2022-11-03 DIAGNOSIS — R13.19 ESOPHAGEAL DYSPHAGIA: ICD-10-CM

## 2022-11-03 PROCEDURE — 78264 GASTRIC EMPTYING IMG STUDY: CPT

## 2022-11-03 RX ORDER — TECHNETIUM TC 99M SULFUR COLLOID 2 MG
1 KIT MISCELLANEOUS
Status: COMPLETED | OUTPATIENT
Start: 2022-11-03 | End: 2022-11-03

## 2022-11-03 RX ADMIN — TECHNETIUM TC 99M SULFUR COLLOID 1 MILLICURIE: KIT at 11:56

## 2022-11-08 ENCOUNTER — OFFICE VISIT (OUTPATIENT)
Dept: ORTHOPEDIC SURGERY | Age: 72
End: 2022-11-08
Payer: MEDICARE

## 2022-11-08 VITALS — HEIGHT: 64 IN | BODY MASS INDEX: 28.34 KG/M2 | WEIGHT: 166 LBS

## 2022-11-08 DIAGNOSIS — M17.11 ARTHRITIS OF KNEE, RIGHT: Primary | ICD-10-CM

## 2022-11-08 PROCEDURE — 20610 DRAIN/INJ JOINT/BURSA W/O US: CPT | Performed by: ORTHOPAEDIC SURGERY

## 2022-11-08 RX ORDER — TRIAMCINOLONE ACETONIDE 40 MG/ML
40 INJECTION, SUSPENSION INTRA-ARTICULAR; INTRAMUSCULAR ONCE
Status: COMPLETED | OUTPATIENT
Start: 2022-11-08 | End: 2022-11-08

## 2022-11-08 RX ADMIN — TRIAMCINOLONE ACETONIDE 40 MG: 40 INJECTION, SUSPENSION INTRA-ARTICULAR; INTRAMUSCULAR at 15:24

## 2022-11-08 NOTE — PROGRESS NOTES
Yisel Maki (: 1950) is a 74 y.o. female patient, here for evaluation of the following chief complaint(s):  Knee Pain (Right knee pain/)       ASSESSMENT/PLAN:  Below is the assessment and plan developed based on review of pertinent history, physical exam, labs, studies, and medications. 74 old female comes in today for right knee pain. She had a previous left knee which is doing well. The right knee has been progressively worsening over time. She came in today to discuss multiple options. She wanted to discuss surgery as well as a potential injection. She says that currently she has moderate stiffness in the morning and moderate pain with twisting. She has mild pain with straightening her knee fully. She has moderate pain with stairs as well as standing upright. She has severe end-stage tricompartmental right knee osteoarthritis on x-ray. She does walk with a limp. We discussed surgery. We discussed risks and benefits of surgery. She understands her elevated risk due to comorbidities. She wants to tentatively schedule this for March when she returns from [place]. She asked for an injection today as well. I injected 40 mg triamcinolone into her right knee joint with sterile technique. She tolerated very well. We will plan to see her this spring for surgery    Risks and benefits of joint arthroplasty discussed at length including but not limited to bleeding, need for blood transfusion, infection, damage to surrounding structures, intra-operative fracture, blood clots, pulmonary embolism, death. The patient understands the risks of surgery. All questions answered. They elected to move forward. 1. Arthritis of knee, right  -     DRAIN/INJECT LARGE JOINT/BURSA      Encounter Diagnosis   Name Primary? Arthritis of knee, right Yes        No follow-ups on file.       SUBJECTIVE/OBJECTIVE:  Yisel Maki (: 1950) is a 74 y.o. female who presents today for the following:  Chief Complaint   Patient presents with    Knee Pain     Right knee pain         74 old female comes in today for right knee pain. She had a previous left knee which is doing well. The right knee has been progressively worsening over time. She came in today to discuss multiple options. She wanted to discuss surgery as well as a potential injection. She says that currently she has moderate stiffness in the morning and moderate pain with twisting. She has mild pain with straightening her knee fully. She has moderate pain with stairs as well as standing upright. IMAGING:  XR Results (most recent):  Results from Appointment encounter on 08/09/22    XR KNEE LT 3 V    Narrative  3 views left knee ordered and independently reviewed. Status post total knee. Well fixed. No complications. She has end-stage right knee osteoarthritis       Allergies   Allergen Reactions    Ceclor [Cefaclor] Rash    Keflex [Cephalexin] Rash    Mobic [Meloxicam] Hives    Penicillamine Rash    Penicillins Rash     NO REPORTED SEVERE NON-IGE-MEDICATED REACTIONS       Current Outpatient Medications   Medication Sig    acetaminophen (TYLENOL) 325 mg tablet Take 2 Tablets by mouth every six (6) hours. cholecalciferol, vitamin D3, 50 mcg (2,000 unit) tab Take 1 Tablet by mouth daily. B infantis/B ani/B katlin/B bifid (PROBIOTIC 4X PO) Take 1 Tablet by mouth daily. multivitamin (ONE A DAY) tablet Take 1 Tablet by mouth daily. TURMERIC PO Take 1 Tablet by mouth daily. glucosamine sulfate (GLUCOSAMINE PO) Take 1 Tablet by mouth daily. 500MG    CRANBERRY PO Take 1 Tablet by mouth daily. losartan-hydroCHLOROthiazide (HYZAAR) 100-12.5 mg per tablet Take 1 Tablet by mouth nightly. mirtazapine (REMERON) 15 mg tablet Take 15 mg by mouth nightly. rosuvastatin (CRESTOR) 5 mg tablet Take 5 mg by mouth nightly. LORazepam (ATIVAN) 0.5 mg tablet Take 0.5 mg by mouth every eight (8) hours as needed.     Biotin 2,500 mcg cap Take 5,000 mcg by mouth daily. omega 3-dha-epa-fish oil (Fish Oil) 100-160-1,000 mg cap Take 1 Cap by mouth daily. Calcium-Cholecalciferol, D3, (Calcium 600 with Vitamin D3) 600 mg(1,500mg) -400 unit chew Take 1 Tab by mouth. ascorbic acid, vitamin C, (VITAMIN C) 500 mg tablet Take 500 mg by mouth daily. methylPREDNISolone (MEDROL DOSEPACK) 4 mg tablet Per dose pack instructions    naloxone (Narcan) 4 mg/actuation nasal spray Use 1 spray intranasally, then discard. Repeat with new spray every 2 min as needed for opioid overdose symptoms, alternating nostrils. ondansetron (ZOFRAN ODT) 4 mg disintegrating tablet Take 1 Tablet by mouth every eight (8) hours as needed for Nausea or Vomiting.    isosorbide dinitrate (ISORDIL) 5 mg tablet Take 30 mg by mouth daily. omeprazole (PRILOSEC) 40 mg capsule Take  by mouth daily. Current Facility-Administered Medications   Medication    triamcinolone acetonide (KENALOG-40) 40 mg/mL injection 40 mg       Past Medical History:   Diagnosis Date    Cancer ([facility] [address]     [facility] AND [facility] - MULTIPLE    COVID-19 2022    Hypertension     Psychiatric disorder     ANXIETY    Rheumatoid arteritis ([facility]     Sleep apnea     CPAP        Past Surgical History:   Procedure Laterality Date    HX APPENDECTOMY      HX CATARACT REMOVAL Right     HX COLONOSCOPY      HX ENDOSCOPY      HX HEART CATHETERIZATION      NO STENT       Family History   Problem Relation Age of Onset    Heart Disease Mother     Cancer Father         LUNG    No Known Problems Sister     Anesth Problems Neg Hx         Social History     Tobacco Use    Smoking status: Former     Packs/day: 0.50     Years: 5.00     Pack years: 2.50     Types: Cigarettes     Quit date: 1973     Years since quittin.3    Smokeless tobacco: Never   Substance Use Topics    Alcohol use: Not Currently        All systems reviewed x 12 and were negative with the exception of None      No flowsheet data found. Vitals:  Ht 5' 4\" (1.626 m)   Wt 166 lb (75.3 kg)   BMI 28.49 kg/m²    Body mass index is 28.49 kg/m². Physical Exam    General: NAD, well developed, well nourished, alert and oriented x 3. Cardiac: Extremities well perfused    Respiratory: Nonlabored breathing    LLE: Normal gait and station. Negative stinchfield. No effusion noted. No previous incisions noted. ROM 0-120 degrees. Grossly stable to varus/valgus stress and anterior/posterior drawer tests. Negative McMurrays. Motor grossly intact. RLE: Antalgic gait. Mild to moderate effusion noted. No previous incisions noted. ROM 0-120 degrees. Grossly stable to varus/valgus stress and anterior/posterior drawer tests. Medial tenderness. Crepitus with range of motion. Motor grossly intact. Vascular: Palpable pedal pulses, equal bilaterally. Skin: Warm well perfused, cap refill < 2 sec. An electronic signature was used to authenticate this note.   -- Mila Aase, MD

## 2022-12-01 ENCOUNTER — ANESTHESIA EVENT (OUTPATIENT)
Dept: ENDOSCOPY | Age: 72
End: 2022-12-01
Payer: MEDICARE

## 2022-12-01 ENCOUNTER — ANESTHESIA (OUTPATIENT)
Dept: ENDOSCOPY | Age: 72
End: 2022-12-01
Payer: MEDICARE

## 2022-12-01 ENCOUNTER — HOSPITAL ENCOUNTER (OUTPATIENT)
Age: 72
Setting detail: OUTPATIENT SURGERY
Discharge: HOME | End: 2022-12-01
Attending: INTERNAL MEDICINE | Admitting: INTERNAL MEDICINE
Payer: MEDICARE

## 2022-12-01 VITALS
HEART RATE: 61 BPM | WEIGHT: 167.55 LBS | RESPIRATION RATE: 16 BRPM | SYSTOLIC BLOOD PRESSURE: 155 MMHG | HEIGHT: 64 IN | OXYGEN SATURATION: 100 % | TEMPERATURE: 98.3 F | BODY MASS INDEX: 28.6 KG/M2 | DIASTOLIC BLOOD PRESSURE: 51 MMHG

## 2022-12-01 PROCEDURE — 74011250636 HC RX REV CODE- 250/636: Performed by: NURSE ANESTHETIST, CERTIFIED REGISTERED

## 2022-12-01 PROCEDURE — 76060000031 HC ANESTHESIA FIRST 0.5 HR: Performed by: INTERNAL MEDICINE

## 2022-12-01 PROCEDURE — 2709999900 HC NON-CHARGEABLE SUPPLY: Performed by: INTERNAL MEDICINE

## 2022-12-01 PROCEDURE — 88305 TISSUE EXAM BY PATHOLOGIST: CPT

## 2022-12-01 PROCEDURE — 77030021593 HC FCPS BIOP ENDOSC BSC -A: Performed by: INTERNAL MEDICINE

## 2022-12-01 PROCEDURE — 74011000250 HC RX REV CODE- 250: Performed by: NURSE ANESTHETIST, CERTIFIED REGISTERED

## 2022-12-01 PROCEDURE — 76040000019: Performed by: INTERNAL MEDICINE

## 2022-12-01 RX ORDER — PROPOFOL 10 MG/ML
INJECTION, EMULSION INTRAVENOUS AS NEEDED
Status: DISCONTINUED | OUTPATIENT
Start: 2022-12-01 | End: 2022-12-01 | Stop reason: HOSPADM

## 2022-12-01 RX ORDER — ATROPINE SULFATE 0.1 MG/ML
0.5 INJECTION INTRAVENOUS
Status: DISCONTINUED | OUTPATIENT
Start: 2022-12-01 | End: 2022-12-01 | Stop reason: HOSPADM

## 2022-12-01 RX ORDER — DEXTROMETHORPHAN/PSEUDOEPHED 2.5-7.5/.8
1.2 DROPS ORAL
Status: DISCONTINUED | OUTPATIENT
Start: 2022-12-01 | End: 2022-12-01 | Stop reason: HOSPADM

## 2022-12-01 RX ORDER — HYDROXYCHLOROQUINE SULFATE 200 MG/1
200 TABLET, FILM COATED ORAL DAILY
COMMUNITY

## 2022-12-01 RX ORDER — SODIUM CHLORIDE 0.9 % (FLUSH) 0.9 %
5-40 SYRINGE (ML) INJECTION AS NEEDED
Status: CANCELLED | OUTPATIENT
Start: 2022-12-01

## 2022-12-01 RX ORDER — EPINEPHRINE 0.1 MG/ML
1 INJECTION INTRACARDIAC; INTRAVENOUS
Status: DISCONTINUED | OUTPATIENT
Start: 2022-12-01 | End: 2022-12-01 | Stop reason: HOSPADM

## 2022-12-01 RX ORDER — FLUMAZENIL 0.1 MG/ML
0.2 INJECTION INTRAVENOUS
Status: DISCONTINUED | OUTPATIENT
Start: 2022-12-01 | End: 2022-12-01 | Stop reason: HOSPADM

## 2022-12-01 RX ORDER — LIDOCAINE HYDROCHLORIDE 20 MG/ML
INJECTION, SOLUTION EPIDURAL; INFILTRATION; INTRACAUDAL; PERINEURAL AS NEEDED
Status: DISCONTINUED | OUTPATIENT
Start: 2022-12-01 | End: 2022-12-01 | Stop reason: HOSPADM

## 2022-12-01 RX ORDER — SODIUM CHLORIDE 0.9 % (FLUSH) 0.9 %
5-40 SYRINGE (ML) INJECTION EVERY 8 HOURS
Status: CANCELLED | OUTPATIENT
Start: 2022-12-01

## 2022-12-01 RX ORDER — NALOXONE HYDROCHLORIDE 0.4 MG/ML
0.4 INJECTION, SOLUTION INTRAMUSCULAR; INTRAVENOUS; SUBCUTANEOUS
Status: DISCONTINUED | OUTPATIENT
Start: 2022-12-01 | End: 2022-12-01 | Stop reason: HOSPADM

## 2022-12-01 RX ORDER — HYDROGEN PEROXIDE 3 %
SOLUTION, NON-ORAL MISCELLANEOUS DAILY
COMMUNITY

## 2022-12-01 RX ADMIN — PROPOFOL 50 MG: 10 INJECTION, EMULSION INTRAVENOUS at 14:25

## 2022-12-01 RX ADMIN — PROPOFOL 100 MG: 10 INJECTION, EMULSION INTRAVENOUS at 14:15

## 2022-12-01 RX ADMIN — LIDOCAINE HYDROCHLORIDE 60 MG: 20 INJECTION, SOLUTION EPIDURAL; INFILTRATION; INTRACAUDAL; PERINEURAL at 14:15

## 2022-12-01 RX ADMIN — PROPOFOL 50 MG: 10 INJECTION, EMULSION INTRAVENOUS at 14:20

## 2022-12-01 NOTE — PROGRESS NOTES
Endoscopy discharge instructions have been reviewed and given to patient. The patient verbalized understanding and acceptance of instructions. Dr. Quan Delong  discussed with patient procedure findings and next steps.

## 2022-12-01 NOTE — DISCHARGE INSTRUCTIONS
[address] Rocío Nielsen MD  [phone]      December 1, 2022     Ena Mitchell  YOB: 1950    ENDOSCOPY DISCHARGE INSTRUCTIONS    If there is redness at IV site you should apply warm compress to area. If redness or soreness persist contact Dr. Serina Palacios or your primary care doctor. Gaseous discomfort may develop, but walking, belching will help relieve this. You may not operate a vehicle for 12 hours  You may not operate machinery or dangerous appliances for rest of today  You may not drink alcoholic beverages for 12 hours  Avoid making any critical decisions for 24 hours    DIET:  You may resume your normal diet, but some patients find that heavy or large meals may lead to indigestion or vomiting. I suggest a light meal as first food intake. MEDICATIONS:  The use of some over-the-counter pain medication may lead to bleeding after biopsies or other procedures you may have had done. Tylenol (also called acetaminophen) is safe to take and will not lead to bleeding. Based on the procedure you had today you may safely take aspirin or aspirin-like products for the next seven (7) days. ACTIVITY:  You may resume your normal household activities, but it is recommended that you spend the remainder of the day resting -  avoid any strenuous activity. CALL DR. Jo Murcia OFFICE IF:  Increasing pain, nausea, vomiting  Abdominal distension (swelling)  Significant new or increased bleeding (oral or rectal)  Fever/Chills  Chest pain/shortness of breath                   Additional instructions:     Impression: Rule out candida esophagitis. Fundic gland polyps. Otherwise normal EGD. Recommendations:  - await pathology results. Will treat candida if positive. - continue daily PPI and increase dose of remeron. She is doing better in terms of symptoms.  Hold on further evaluation at this time.     It was an honor to be your doctor today. Please let me or my office staff know if you have any feedback about today's procedure.     Stephanie Desir MD

## 2022-12-01 NOTE — INTERVAL H&P NOTE
Pre-Endoscopy H&P Update  Chief complaint/HPI/ROS:  The indication for the procedure, the patient's history and the patient's current medications are reviewed prior to the procedure and that data is reported on the H&P to which this document is attached. Any significant complaints with regard to organ systems will be noted, and if not mentioned then a review of systems is not contributory. Past Medical History:   Diagnosis Date    Cancer ([facility]     [facility] AND SCC - MULTIPLE    COVID-19 2022    Hypertension     Psychiatric disorder     ANXIETY    Rheumatoid arteritis ([facility]     Sleep apnea     CPAP      Past Surgical History:   Procedure Laterality Date    HX APPENDECTOMY  1963    HX CATARACT REMOVAL Right     HX COLONOSCOPY      HX ENDOSCOPY      HX HEART CATHETERIZATION      NO STENT     Social   Social History     Tobacco Use    Smoking status: Former     Packs/day: 0.50     Years: 5.00     Pack years: 2.50     Types: Cigarettes     Quit date: 1973     Years since quittin.4    Smokeless tobacco: Never   Substance Use Topics    Alcohol use: Not Currently      Family History   Problem Relation Age of Onset    Heart Disease Mother     Cancer Father         LUNG    No Known Problems Sister     Anesth Problems Neg Hx       Allergies   Allergen Reactions    Ceclor [Cefaclor] Rash    Keflex [Cephalexin] Rash    Mobic [Meloxicam] Hives    Penicillamine Rash    Penicillins Rash     NO REPORTED SEVERE NON-IGE-MEDICATED REACTIONS      Prior to Admission Medications   Prescriptions Last Dose Informant Patient Reported? Taking? B infantis/B ani/B katlin/B bifid (PROBIOTIC 4X PO) 2022  Yes Yes   Sig: Take 1 Tablet by mouth daily. Biotin 2,500 mcg cap 2022  Yes Yes   Sig: Take 5,000 mcg by mouth daily. CRANBERRY PO 2022  Yes Yes   Sig: Take 1 Tablet by mouth daily.    Calcium-Cholecalciferol, D3, (Calcium 600 with Vitamin D3) 600 mg(1,500mg) -400 unit chew 2022  Yes Yes   Sig: Take 1 Tab by mouth. LORazepam (ATIVAN) 0.5 mg tablet Unknown  Yes No   Sig: Take 0.5 mg by mouth every eight (8) hours as needed. TURMERIC PO 11/24/2022  Yes Yes   Sig: Take 1 Tablet by mouth daily. acetaminophen (TYLENOL) 325 mg tablet 11/30/2022  No Yes   Sig: Take 2 Tablets by mouth every six (6) hours. ascorbic acid, vitamin C, (VITAMIN C) 500 mg tablet 11/1/2022  Yes Yes   Sig: Take 500 mg by mouth daily. cholecalciferol, vitamin D3, 50 mcg (2,000 unit) tab 11/24/2022  Yes Yes   Sig: Take 1 Tablet by mouth daily. esomeprazole (NexIUM) 20 mg capsule 12/1/2022  Yes Yes   Sig: Take  by mouth daily. glucosamine sulfate (GLUCOSAMINE PO) 11/24/2022  Yes Yes   Sig: Take 1 Tablet by mouth daily. 500MG   hydrOXYchloroQUINE (PLAQUENIL) 200 mg tablet 11/30/2022  Yes Yes   Sig: Take 200 mg by mouth daily. losartan-hydroCHLOROthiazide (HYZAAR) 100-12.5 mg per tablet 11/30/2022  Yes Yes   Sig: Take 1 Tablet by mouth nightly. mirtazapine (REMERON) 15 mg tablet 11/30/2022  Yes Yes   Sig: Take 15 mg by mouth nightly. multivitamin (ONE A DAY) tablet 11/24/2022  Yes Yes   Sig: Take 1 Tablet by mouth daily. omega 3-dha-epa-fish oil (Fish Oil) 100-160-1,000 mg cap 11/24/2022  Yes Yes   Sig: Take 1 Cap by mouth daily. rosuvastatin (CRESTOR) 5 mg tablet 11/30/2022  Yes Yes   Sig: Take 5 mg by mouth nightly. Facility-Administered Medications: None       PHYSICAL EXAM:  The patient is examined immediately prior to the procedure. Visit Vitals  BP (!) 155/64   Pulse 74   Temp 97.7 °F (36.5 °C)   Resp 15   Ht 5' 3.5\" (1.613 m)   Wt 76 kg (167 lb 8.8 oz)   SpO2 100%   Breastfeeding No   BMI 29.21 kg/m²     Gen: Appears comfortable, no distress. Pulm: comfortable respirations with no abnormal audible breath sounds  HEART: well perfused, no abnormal audible heart sounds  GI: abdomen flat. PLAN:  Informed consent discussion held, patient afforded an opportunity to ask questions and all questions answered.   After being advised of the risks, benefits, and alternatives, the patient requested that we proceed and indicated so on a written consent form. Will proceed with procedure as planned.   Christina Marino MD

## 2022-12-01 NOTE — H&P
74 y.o. female presents for diagnostic EGD for chronic nausea, reflux, dysphagia. Additional H&P data will be attached on the day of procedure.     Brittnay Devine MD

## 2022-12-01 NOTE — PROCEDURES
[address] Rocío Alvarez MD  [phone]      2022    Esophagogastroduodenoscopy (EGD) Procedure Note  Chapito Birtton  : 1950  [facility] Medical Record Number: [id number]      Indications:   GERD, nausea  Referring Physician:  Serina Jc MD  Anesthesia/Sedation:  Conscious sedation/deep sedation/monitored anesthesia -- see notes. Endoscopist:  Dr. Fanny Flores  Complications:  None  Estimated Blood Loss:  None    Surgical assistant: None  Implants none unless otherwise specified. Permit:  The indications, risks, benefits and alternatives were reviewed with the patient or their decision maker who was provided an opportunity to ask questions and all questions were answered. The specific risks of esophagogastroduodenoscopy with conscious sedation were reviewed, including but not limited to anesthetic complication, bleeding, adverse drug reaction, missed lesion, infection, IV site reactions, and intestinal perforation which would lead to the need for surgical repair. Alternatives to EGD including radiographic imaging, observation without testing, or laboratory testing were reviewed as well as the limitations of those alternatives discussed. After considering the options and having all their questions answered, the patient or their decision maker provided both verbal and written consent to proceed. Procedure in Detail:  After obtaining informed consent, positioning of the patient in the left lateral decubitus position, and conduction of a pre-procedure pause or \"time out\" the endoscope was introduced into the mouth and advanced to the duodenum. A careful inspection was made, and findings or interventions are described below. Findings:   Esophagus: Few yellow plaques in mid esophagus that could be candida esophagitis. Biopsies were taken. No erosive esophagitis.  No stricture, ring or web. No Salazar's esophagus. No large hiatal hernia. Stomach: Fundic gland polyps in gastric body. Random gastric biopsies taken. Duodenum/jejunum: Normal.      Specimens:   Gastric bx  Mid esophageal bx    Impression: Rule out candida esophagitis. Fundic gland polyps. Otherwise normal EGD. Recommendations:  - await pathology results. Will treat candida if positive. - continue daily PPI and increase dose of remeron. She is doing better in terms of symptoms. Hold on further evaluation at this time. Thank you for entrusting me with this patient's care. Please do not hesitate to contact me with any questions or if I can be of assistance with any of your other patients' GI needs.   Signed By: Brisa Alford MD                        December 1, 2022

## 2022-12-01 NOTE — PERIOP NOTES

## 2022-12-01 NOTE — ANESTHESIA PREPROCEDURE EVALUATION
Relevant Problems   No relevant active problems       Anesthetic History   No history of anesthetic complications            Review of Systems / Medical History  Patient summary reviewed and pertinent labs reviewed    Pulmonary        Sleep apnea           Neuro/Psych         Psychiatric history     Cardiovascular    Hypertension          CAD    Exercise tolerance: >4 METS     GI/Hepatic/Renal               Comments: Nausea Endo/Other        Arthritis     Other Findings   Comments: RA           Physical Exam    Airway  Mallampati: III  TM Distance: < 4 cm  Neck ROM: normal range of motion   Mouth opening: Normal     Cardiovascular  Regular rate and rhythm,  S1 and S2 normal,  no murmur, click, rub, or gallop  Rhythm: regular  Rate: normal         Dental  No notable dental hx       Pulmonary  Breath sounds clear to auscultation               Abdominal  GI exam deferred       Other Findings            Anesthetic Plan    ASA: 3  Anesthesia type: MAC          Induction: Intravenous  Anesthetic plan and risks discussed with: Patient

## 2022-12-01 NOTE — PROGRESS NOTES
Goncalves Antis  1950  [id number]    Situation:  Verbal report received from:   Karlee Ortiz RN   Procedure: Procedure(s):  ESOPHAGOGASTRODUODENOSCOPY (EGD)  ESOPHAGOGASTRODUODENAL (EGD) BIOPSY    Background:    Preoperative diagnosis: ESOPHAGEAL DYSPHAGIA, GASTROESOPHAGEAL REFLUX DISEASE. NAUSEA  Postoperative diagnosis: 1.- Candidiasis  2.- Fundic Gland Polyps    :  Dr. Darlin Carrington  Assistant(s): Endoscopy RN-1: Ramon Guardado RN  Endoscopy RN-2: Zac Hoover RN    Specimens:   ID Type Source Tests Collected by Time Destination   1 : Antrum Biopsies Preservative Gastric  Kari Stephens MD 12/1/2022 1421 Pathology   2 : Biopsies Preservative Esophagus, Mid  Kari Stephens MD 12/1/2022 1421 Pathology     H. Pylori  no    Assessment:  Intra-procedure medications     Anesthesia gave intra-procedure sedation and medications, see anesthesia flow sheet yes    Intravenous fluids: NS@ KVO     Vital signs stable   yes    Abdominal assessment: round and soft   yes    Recommendation:  Discharge patient per MD order  yes.   Return to floor  outpatient  Family or Friend   spouse  Permission to share finding with family or friend yes

## 2022-12-02 NOTE — ANESTHESIA POSTPROCEDURE EVALUATION
Procedure(s):  ESOPHAGOGASTRODUODENOSCOPY (EGD)  ESOPHAGOGASTRODUODENAL (EGD) BIOPSY. MAC    Anesthesia Post Evaluation      Multimodal analgesia: multimodal analgesia not used between 6 hours prior to anesthesia start to PACU discharge  Patient location during evaluation: PACU  Patient participation: complete - patient participated  Level of consciousness: awake  Pain management: adequate  Airway patency: patent  Anesthetic complications: no  Cardiovascular status: acceptable, blood pressure returned to baseline and hemodynamically stable  Respiratory status: acceptable  Hydration status: acceptable  Post anesthesia nausea and vomiting:  controlled  Final Post Anesthesia Temperature Assessment:  Normothermia (36.0-37.5 degrees C)      INITIAL Post-op Vital signs:   Vitals Value Taken Time   /51 12/01/22 1447   Temp 36.8 °C (98.3 °F) 12/01/22 1435   Pulse 61 12/01/22 1450   Resp 12 12/01/22 1450   SpO2 100 % 12/01/22 1450   Vitals shown include unvalidated device data.

## 2023-01-25 DIAGNOSIS — M17.11 ARTHRITIS OF KNEE, RIGHT: Primary | ICD-10-CM

## 2023-03-14 ENCOUNTER — HOSPITAL ENCOUNTER (OUTPATIENT)
Dept: PREADMISSION TESTING | Age: 73
Discharge: HOME | End: 2023-03-14
Payer: MEDICARE

## 2023-03-14 LAB
ABO + RH BLD: NORMAL
ANION GAP SERPL CALC-SCNC: 5 MMOL/L (ref 5–15)
APPEARANCE UR: CLEAR
BACTERIA URNS QL MICRO: NEGATIVE /HPF
BILIRUB UR QL: NEGATIVE
BLOOD GROUP ANTIBODIES SERPL: NORMAL
BUN SERPL-MCNC: 25 MG/DL (ref 6–20)
BUN/CREAT SERPL: 26 (ref 12–20)
CALCIUM SERPL-MCNC: 10 MG/DL (ref 8.5–10.1)
CHLORIDE SERPL-SCNC: 103 MMOL/L (ref 97–108)
CO2 SERPL-SCNC: 31 MMOL/L (ref 21–32)
COLOR UR: NORMAL
CREAT SERPL-MCNC: 0.96 MG/DL (ref 0.55–1.02)
EPITH CASTS URNS QL MICRO: NORMAL /LPF
ERYTHROCYTE [DISTWIDTH] IN BLOOD BY AUTOMATED COUNT: 12.4 % (ref 11.5–14.5)
EST. AVERAGE GLUCOSE BLD GHB EST-MCNC: 108 MG/DL
GLUCOSE SERPL-MCNC: 106 MG/DL (ref 65–100)
GLUCOSE UR STRIP.AUTO-MCNC: NEGATIVE MG/DL
HBA1C MFR BLD: 5.4 % (ref 4–5.6)
HCT VFR BLD AUTO: 37.2 % (ref 35–47)
HGB BLD-MCNC: 12.6 G/DL (ref 11.5–16)
HGB UR QL STRIP: NEGATIVE
HYALINE CASTS URNS QL MICRO: NORMAL /LPF (ref 0–5)
INR PPP: 1 (ref 0.9–1.1)
KETONES UR QL STRIP.AUTO: NEGATIVE MG/DL
LEUKOCYTE ESTERASE UR QL STRIP.AUTO: NEGATIVE
MCH RBC QN AUTO: 31.6 PG (ref 26–34)
MCHC RBC AUTO-ENTMCNC: 33.9 G/DL (ref 30–36.5)
MCV RBC AUTO: 93.2 FL (ref 80–99)
NITRITE UR QL STRIP.AUTO: NEGATIVE
NRBC # BLD: 0 K/UL (ref 0–0.01)
NRBC BLD-RTO: 0 PER 100 WBC
PH UR STRIP: 7.5 (ref 5–8)
PLATELET # BLD AUTO: 148 K/UL (ref 150–400)
PMV BLD AUTO: 11.5 FL (ref 8.9–12.9)
POTASSIUM SERPL-SCNC: 4.4 MMOL/L (ref 3.5–5.1)
PROT UR STRIP-MCNC: NEGATIVE MG/DL
PROTHROMBIN TIME: 10.9 SEC (ref 9–11.1)
RBC # BLD AUTO: 3.99 M/UL (ref 3.8–5.2)
RBC #/AREA URNS HPF: NORMAL /HPF (ref 0–5)
SODIUM SERPL-SCNC: 139 MMOL/L (ref 136–145)
SP GR UR REFRACTOMETRY: 1.01 (ref 1–1.03)
SPECIMEN EXP DATE BLD: NORMAL
UA: UC IF INDICATED,UAUC: NORMAL
UROBILINOGEN UR QL STRIP.AUTO: 0.2 EU/DL (ref 0.2–1)
WBC # BLD AUTO: 4.9 K/UL (ref 3.6–11)
WBC URNS QL MICRO: NORMAL /HPF (ref 0–4)

## 2023-03-14 PROCEDURE — 85027 COMPLETE CBC AUTOMATED: CPT

## 2023-03-14 PROCEDURE — 83036 HEMOGLOBIN GLYCOSYLATED A1C: CPT

## 2023-03-14 PROCEDURE — 85610 PROTHROMBIN TIME: CPT

## 2023-03-14 PROCEDURE — 81001 URINALYSIS AUTO W/SCOPE: CPT

## 2023-03-14 PROCEDURE — 86900 BLOOD TYPING SEROLOGIC ABO: CPT

## 2023-03-14 PROCEDURE — 36415 COLL VENOUS BLD VENIPUNCTURE: CPT

## 2023-03-14 PROCEDURE — 80048 BASIC METABOLIC PNL TOTAL CA: CPT

## 2023-03-14 RX ORDER — LOSARTAN POTASSIUM AND HYDROCHLOROTHIAZIDE 25; 100 MG/1; MG/1
1 TABLET ORAL
COMMUNITY

## 2023-03-14 RX ORDER — GUAIFENESIN 100 MG/5ML
81 LIQUID (ML) ORAL DAILY
COMMUNITY

## 2023-03-14 NOTE — PERIOP NOTES
ORTHO PRE OP AND MEDICATION INSTRUCTIONS PRINTED AND REVIEWED WITH PATIENT. TWO BOTTLES OF CHG SOAP GIVEN. OPPORTUNITY FOR QUESTIONS GIVEN AND ALL QUESTIONS WERE ANSWERED. GIVEN. SURGICAL SITE INFECTION SHEET GIVEN. Amber Perla REQUESTED MOST RECENT CARDIAC NOTE FROM DR. Sotero Cam [phone].  RECEIVED NOTE AND PLACED ON MEDICAL CHART LOV 04-    REQUESTED MOST RECENT EKG FROM  PCP OFFICE RECEIVED EKG AND PLACED ON MEDICAL CHART

## 2023-03-14 NOTE — PERIOP NOTES
[place] [address]  ORTHOPAEDIC    Surgery Date:   03-    Your surgeon's office or [facility] staff will call you between 4 PM- 8 PM the day before surgery with your arrival time. If your surgery is on a Monday, you will receive a call the preceding Friday. Please report to [facility] Patient Access/Admitting on the 1st floor. Bring your insurance card, photo identification, and any copayment (if applicable). If you are going home the same day of your surgery, you must have a responsible adult to drive you home. You need to have a responsible adult to stay with you the first 24 hours after surgery and you should not drive a car for 24 hours following your surgery. Do NOT eat any solid foods after midnight the night before surgery including candy, mints or gum. You may drink clear liquids from midnight until 1 hour prior to arrival time. You may drink up to 12 ounces at one time every 4 hours. Do NOT drink alcohol or smoke 24 hours before surgery. STOP smoking for 14 days prior as it helps with breathing and healing after surgery. If your arrival time is 3pm or later, you may eat a light breakfast before 8am (toast, bagel-no butter, black coffee, plain tea, fruit juice-no pulp) Please note special instructions, if applicable, below for medications. If you are being admitted to the hospital,please leave personal belongings/luggage in your car until you have an assigned hospital room number. Please wear comfortable clothes. Wear your glasses instead of contacts. We ask that all money, jewelry and valuables be left at home. Wear no make up, particularly mascara, the day of surgery. All body piercings, rings, and jewelry need to be removed and left at home. Please remove any nail polish or artificial nails from your fingernails. Please wear your hair loose or down. Please no pony-tails, buns, or any metal hair accessories.  If you shower the morning of surgery, please do not apply any lotions or powders afterwards. You may wear deodorant. Do not shave any body area within 24 hours of your surgery. Please follow all instructions to avoid any potential surgical cancellation. Should your physical condition change, (i.e. fever, cold, flu, etc.) please notify your surgeon as soon as possible. It is important to be on time. If a situation occurs where you may be delayed, please call:  [phone] / [phone] on the day of surgery. The Preadmission Testing staff can be reached at [phone]. Special instructions: [address] ON DAY OF SURGERY    Current Outpatient Medications   Medication Sig    losartan-hydroCHLOROthiazide (HYZAAR) 100-25 mg per tablet Take 1 Tablet by mouth nightly. aspirin 81 mg chewable tablet Take 81 mg by mouth daily. hydrOXYchloroQUINE (PLAQUENIL) 200 mg tablet Take 200 mg by mouth daily. esomeprazole (NEXIUM) 20 mg capsule Take  by mouth daily. acetaminophen (TYLENOL) 325 mg tablet Take 2 Tablets by mouth every six (6) hours. cholecalciferol, vitamin D3, 50 mcg (2,000 unit) tab Take 1 Tablet by mouth daily. B infantis/B ani/B katlin/B bifid (PROBIOTIC 4X PO) Take 1 Tablet by mouth daily. multivitamin (ONE A DAY) tablet Take 1 Tablet by mouth daily. TURMERIC PO Take 1 Tablet by mouth daily. glucosamine sulfate (GLUCOSAMINE PO) Take 1 Tablet by mouth daily. 500MG    CRANBERRY PO Take 1 Tablet by mouth daily. mirtazapine (REMERON) 15 mg tablet Take 30 mg by mouth nightly. rosuvastatin (CRESTOR) 5 mg tablet Take 5 mg by mouth nightly. LORazepam (ATIVAN) 0.5 mg tablet Take 0.5 mg by mouth every eight (8) hours as needed. Biotin 2,500 mcg cap Take 5,000 mcg by mouth daily. omega 3-dha-epa-fish oil (Fish Oil) 100-160-1,000 mg cap Take 1 Cap by mouth daily. Calcium-Cholecalciferol, D3, (Calcium 600 with Vitamin D3) 600 mg(1,500mg) -400 unit chew Take 1 Tab by mouth.     ascorbic acid, vitamin C, (VITAMIN C) 500 mg tablet Take 500 mg by mouth daily. No current facility-administered medications for this encounter. YOU MUST ONLY TAKE THESE MEDICATIONS THE MORNING OF SURGERY WITH A SIP OF WATER: PLAQUENIL,NEXIUM,  MEDICATIONS TO TAKE THE MORNING OF SURGERY ONLY IF NEEDED: LORAZEPAM  HOLD these prescription medications BEFORE Surgery:   Ask your surgeon/prescribing physician about when/if to STOP taking these medications:   Stop any non-steroidal anti-inflammatory drugs (i.e. Ibuprofen, Naproxen, Advil, Aleve) 3 days before surgery. You may take Tylenol. STOP all vitamins and herbal supplements 1 week prior to  surgery. If you are currently taking Plavix, Coumadin, or any other blood-thinning/anticoagulant medication contact your prescribing physician for instructions. Preventing Infections Before and After - Your Surgery    IMPORTANT INSTRUCTIONS    You play an important role in your health and preparation for surgery. To reduce the germs on your skin you will need to shower with CHG soap (Chorhexidine gluconate 4%) two times before surgery. CHG soap (Hibiclens, Hex-A-Clens or store brand)  CHG soap will be provided at your Preadmission Testing (PAT) appointment. If you do not have a PAT appointment before surgery, you may arrange to  CHG soap from our office or purchase CHG soap at a pharmacy, grocery or department store. You need to purchase TWO 4 ounce bottles to use for your 2 showers. Steps to follow:  Rich Roam your hair with your normal shampoo and your body with regular soap and rinse well to remove shampoo and soap from your skin. Wet a clean washcloth and turn off the shower. Put CHG soap on washcloth and apply to your entire body from the neck down. Do not use on your head, face or private parts(genitals). Do not use CHG soap on open sores, wounds or areas of skin irritation. Wash you body gently for 5 minutes. Do not wash your skin too hard.  This soap does not create lather. Pay special attention to your underarms and from your belly button to your feet. Turn the shower back on and rinse well to get CHG soap off your body. Pat your skin dry with a clean, dry towel. Do not apply lotions or moisturizer. Put on clean clothes and sleep on fresh bed sheets and do not allow pets to sleep with you. Shower with CHG soap 2 times before your surgery  The evening before your surgery  The morning of your surgery      Tips to help prevent infections after your surgery:  Protect your surgical wound from germs:  Hand washing is the most important thing you and your caregivers can do to prevent infections. Keep your bandage clean and dry! Do not touch your surgical wound. Use clean, freshly washed towels and washcloths every time you shower; do not share bath linens with others. Until your surgical wound is healed, wear clothing and sleep on bed linens each day that are clean and freshly washed. Do not allow pets to sleep in your bed with you or touch your surgical wound. Do not smoke - smoking delays wound healing. This may be a good time to stop smoking. If you have diabetes, it is important for you to manage your blood sugar levels properly before your surgery as well as after your surgery. Poorly managed blood sugar levels slow down wound healing and prevent you from healing completely. Prevention of Infection  Testing for Staphylococcus aureus on your skin before surgery    Staphylococcus aureus (staph) is a common bacteria that is found on the body. It normally does not cause infection on healthy skin. Before surgery, you will be tested to see if you have staph by swabbing the inside of your nose. When you have an incision with surgery, the goal is to protect that incision from infection. Removal of the staph bacteria before surgery can decrease the risk of a surgical site infection. If your nose swab is positive for staph you will be called.  Your treatment will include 2 steps:  Prescription for Mupirocin ointment to be used in each nostril twice a day for 5 days. Showering with Chlorhexidine (CHG) liquid soap for 5 days prior to surgery. How to use Mupirocin ointment in your nose   the prescription from your pharmacy. You will receive a large tube of ointment which will be big enough for all of your treatments. You will apply this ointment to each nostril 2 times a day for 5 days. Wash your hands with  gel or soap and water for 20 seconds before using ointment. Place a pea-sized amount of ointment on a cotton Q-tip. Apply ointment just inside of each nostril with the Q-tip. Do not push Q-tip or ointment deep inside you nose. Press your nostrils together and massage for a few seconds. Wash your hands with  gel or soap and water after you are finished. Do not get ointment near your eyes. If it gets into your eyes, rinse them with cool water. If you need to use nasal spray, clean the tip of the bottle with alcohol before use and do not use both at the same time. If you are scheduled for COVID testing during the 5 days, do NOT apply morning dose until after the COVID test has been performed. How to use Chlorhexidine (CHG) 4% liquid soap  Purchase an 8 ounce bottle of CHG liquid soap (Chlorhexidine 4%, Hibiclens, Hex-A-Clens or store brand) at a pharmacy or grocery store. Wash your hair with your normal shampoo and your body with regular soap and rinse well to remove shampoo and soap from your skin. Wet a clean washcloth and turn off the shower. Put CHG soap on washcloth and apply to your entire body from the neck down. Do not use on your head, face or private parts(genitals). Do not use CHG soap on open sores, wounds or areas of skin irritation. Wash your body gently for 5 minutes. Do not wash your skin too hard. This soap does not create lather. Pay special attention to your underarms and from your belly button to your feet.   Turn the shower back on and rinse well to get CHG soap off your body. Pat your skin dry with a clean, dry towel. Do not apply lotions or moisturizer. Put on clean clothes and sleep on fresh bed sheets the night before surgery. Do not allow pets to sleep with you. Eating and Drinking Before Surgery    You may eat a regular dinner at the usual time on the day before your surgery. Do NOT eat any solid foods after midnight unless your arrival time at the hospital is 3pm or later. You may drink clear liquids only from 12 midnight until 1 hours prior to your arrival time at the hospital on the day of your surgery. Do NOT drink alcohol. Clear liquids include:  Water  Fruit juices without pulp( i.e. apple juice)  Carbonated beverages  Black coffee (no cream/milk)  Tea (no cream/milk)  Gatorade  You may drink up to 12-16 ounces at one time every 4 hours between the hours of midnight and 1 hour before your arrival time at the hospital. Example- if your arrival time at the hospital is 6am, you may drink 12-16 ounces of clear liquids no later than 5am.  If your arrival time at the hospital is 3pm or later, you may eat a light breakfast before 8am.  A light breakfast includes: Toast or bagel (no butter)  Black coffee (no cream/milk)  Tea (no cream/milk)  Fruit juices without pulp ( i.e. apple juice)  Do NOT eat meat, eggs, vegetables or fruit  If you have any questions, please contact your surgeon's office. Patient Information Regarding COVID Restrictions    Day of Procedure    Please park in the parking deck or any designated visitor parking lot. Enter the facility through the Main Entrance of the hospital.  On the day of surgery, please provide the cell phone number of the person who will be waiting for you to the Patient Access representative at the time of registration. Masks are highly recommended in the hospital, but not required.   Once your procedure and the immediate recovery period is completed, a nurse in the recovery area will contact your designated visitor to inform them of your room number or to otherwise review other pertinent information regarding your care. Social distancing practices are strongly encouraged in waiting areas and the cafeteria. The patient was contacted in person. She verbalized understanding of all instructions does not  need reinforcement.

## 2023-03-15 LAB
BACTERIA SPEC CULT: NORMAL
BACTERIA SPEC CULT: NORMAL
SERVICE CMNT-IMP: NORMAL

## 2023-03-15 NOTE — PERIOP NOTES
PAT Nurse Practitioner   Pre-Operative Chart Review/Assessment:-ORTHOPEDIC                Patient Name:  Jeannine Hodges                                                           Age:   74 y.o.    :  1950     Today's Date:  3/16/2023     Date of PAT:   3/14/23      Date of Surgery:    3/20/23      Procedure(s):  Right Total Knee Arthroplasty     Surgeon:   Dr. Christian Alberts                       PLAN:      1)  Medical Clearance/PCP:  Oscar Marie MD      2)  Cardiac Clearance:  Pt followed by Dr. Washington Paola). LOV 22. Condition stable, pt has CAD w/ GDMT, no previous stents. EKG from PCP on 22 showed Sinus Rhythm. OV note and EKG on chart and scanned under media. 3)  Diabetic Treatment Consult:  Not indicated. A1c-5.4      4)  Sleep Apnea evaluation:   +GRACIE dx. Pt uses CPAP. 5) Treatment for MRSA/Staph Aureus:  Neg      6) Additional Concerns:  Former smoker, HTN, GERD, CAD, RA, anxiety                Vital Signs:         Vitals:    23 1142   BP: (P) 133/75   Pulse: (P) 67   Resp: (P) 16   Temp: (P) 98.5 °F (36.9 °C)   SpO2: (P) 98%   Weight: (P) 76.3 kg (168 lb 3.2 oz)   Height: (P) 5' 3.5\" (1.613 m)          Body mass index is 29.33 kg/m² (pended).          ____________________________________________  PAST MEDICAL HISTORY  Past Medical History:   Diagnosis Date    Cancer ([facility] [place] [address]     [address] AND [facility] - MULTIPLE    COVID-19 2022    GERD (gastroesophageal reflux disease) 1970    Hypertension     Psychiatric disorder     ANXIETY    Rheumatoid arteritis ([facility] [place] [address]     Sleep apnea     CPAP      ____________________________________________  PAST SURGICAL HISTORY  Past Surgical History:   Procedure Laterality Date    HX APPENDECTOMY  1963    HX CATARACT REMOVAL Right     HX COLONOSCOPY      HX ENDOSCOPY      HX HEART CATHETERIZATION      NO STENT    HX KNEE REPLACEMENT Left       ____________________________________________  HOME MEDICATIONS  Current Outpatient Medications   Medication Sig    losartan-hydroCHLOROthiazide (HYZAAR) 100-25 mg per tablet Take 1 Tablet by mouth nightly. aspirin 81 mg chewable tablet Take 81 mg by mouth daily. hydrOXYchloroQUINE (PLAQUENIL) 200 mg tablet Take 200 mg by mouth daily. esomeprazole (NEXIUM) 20 mg capsule Take  by mouth daily. acetaminophen (TYLENOL) 325 mg tablet Take 2 Tablets by mouth every six (6) hours. cholecalciferol, vitamin D3, 50 mcg (2,000 unit) tab Take 1 Tablet by mouth daily. B infantis/B ani/B katlin/B bifid (PROBIOTIC 4X PO) Take 1 Tablet by mouth daily. multivitamin (ONE A DAY) tablet Take 1 Tablet by mouth daily. TURMERIC PO Take 1 Tablet by mouth daily. glucosamine sulfate (GLUCOSAMINE PO) Take 1 Tablet by mouth daily. 500MG    CRANBERRY PO Take 1 Tablet by mouth daily. mirtazapine (REMERON) 15 mg tablet Take 30 mg by mouth nightly. rosuvastatin (CRESTOR) 5 mg tablet Take 5 mg by mouth nightly. LORazepam (ATIVAN) 0.5 mg tablet Take 0.5 mg by mouth every eight (8) hours as needed. Biotin 2,500 mcg cap Take 5,000 mcg by mouth daily. omega 3-dha-epa-fish oil (Fish Oil) 100-160-1,000 mg cap Take 1 Cap by mouth daily. Calcium-Cholecalciferol, D3, (Calcium 600 with Vitamin D3) 600 mg(1,500mg) -400 unit chew Take 1 Tab by mouth. ascorbic acid, vitamin C, (VITAMIN C) 500 mg tablet Take 500 mg by mouth daily.      No current facility-administered medications for this encounter.      ____________________________________________  ALLERGIES  Allergies   Allergen Reactions    Ceclor [Cefaclor] Rash    Keflex [Cephalexin] Rash    Mobic [Meloxicam] Hives    Penicillamine Rash    Penicillins Rash     NO REPORTED SEVERE NON-IGE-MEDICATED REACTIONS      ____________________________________________  SOCIAL HISTORY  Social History     Tobacco Use    Smoking status: Former     Packs/day: 0.00     Years: 5.00     Pack years: 0.00     Types: Cigarettes     Quit date: 1971     Years since quittin.7 Smokeless tobacco: Never   Substance Use Topics    Alcohol use: Not Currently      ____________________________________________   Internal Administration   First Dose      Second Dose         Last COVID Lab No results found for: Latoya Almonte, RCV2CT, CVD2M, [place], XPLCVT, [address], [address] [facility], [address], [address] [place]                 Labs:     Hospital Outpatient Visit on 03/14/2023   Component Date Value Ref Range Status    Sodium 03/14/2023 139  136 - 145 mmol/L Final    Potassium 03/14/2023 4.4  3.5 - 5.1 mmol/L Final    Chloride 03/14/2023 103  97 - 108 mmol/L Final    CO2 03/14/2023 31  21 - 32 mmol/L Final    Anion gap 03/14/2023 5  5 - 15 mmol/L Final    Glucose 03/14/2023 106 (A)  65 - 100 mg/dL Final    BUN 03/14/2023 25 (A)  6 - 20 MG/DL Final    Creatinine 03/14/2023 0.96  0.55 - 1.02 MG/DL Final    BUN/Creatinine ratio 03/14/2023 26 (A)  12 - 20   Final    eGFR 03/14/2023 >60  >60 ml/min/1.73m2 Final    Comment:      Pediatric calculator link: NoéshShow.at. org/professionals/kdoqi/gfr_calculatorped       These results are not intended for use in patients <25years of age. eGFR results are calculated without a race factor using  the 2021 CKD-EPI equation. Careful clinical correlation is recommended, particularly when comparing to results calculated using previous equations. The CKD-EPI equation is less accurate in patients with extremes of muscle mass, extra-renal metabolism of creatinine, excessive creatine ingestion, or following therapy that affects renal tubular secretion.       Calcium 03/14/2023 10.0  8.5 - 10.1 MG/DL Final    WBC 03/14/2023 4.9  3.6 - 11.0 K/uL Final    RBC 03/14/2023 3.99  3.80 - 5.20 M/uL Final    HGB 03/14/2023 12.6  11.5 - 16.0 g/dL Final    HCT 03/14/2023 37.2  35.0 - 47.0 % Final    MCV 03/14/2023 93.2  80.0 - 99.0 FL Final    MCH 03/14/2023 31.6  26.0 - 34.0 PG Final    MCHC 03/14/2023 33.9  30.0 - 36.5 g/dL Final    RDW 03/14/2023 12.4 11.5 - 14.5 % Final    PLATELET 80/28/8026 293 (A)  150 - 400 K/uL Final    MPV 03/14/2023 11.5  8.9 - 12.9 FL Final    NRBC 03/14/2023 0.0  0  WBC Final    ABSOLUTE NRBC 03/14/2023 0.00  0.00 - 0.01 K/uL Final    Crossmatch Expiration 03/14/2023 03/23/2023,2359   Final    ABO/Rh(D) 03/14/2023 A POSITIVE   Final    Antibody screen 03/14/2023 NEG   Final    INR 03/14/2023 1.0  0.9 - 1.1   Final    A single therapeutic range for Vit K antagonists may not be optimal for all indications - see June, 2008 issue of Chest, American College of Chest Physicians Evidence-Based Clinical Practice Guidelines, 8th Edition. Prothrombin time 03/14/2023 10.9  9.0 - 11.1 sec Final    Color 03/14/2023 YELLOW/STRAW    Final    Color Reference Range: Straw, Yellow or Dark Yellow    Appearance 03/14/2023 CLEAR  CLEAR   Final    Specific gravity 03/14/2023 1.014  1.003 - 1.030   Final    pH (UA) 03/14/2023 7.5  5.0 - 8.0   Final    Protein 03/14/2023 Negative  NEG mg/dL Final    Glucose 03/14/2023 Negative  NEG mg/dL Final    Ketone 03/14/2023 Negative  NEG mg/dL Final    Bilirubin 03/14/2023 Negative  NEG   Final    Blood 03/14/2023 Negative  NEG   Final    Urobilinogen 03/14/2023 0.2  0.2 - 1.0 EU/dL Final    Nitrites 03/14/2023 Negative  NEG   Final    Leukocyte Esterase 03/14/2023 Negative  NEG   Final    UA:UC IF INDICATED 03/14/2023 CULTURE NOT INDICATED BY UA RESULT  CNI   Final    WBC 03/14/2023 0-4  0 - 4 /hpf Final    RBC 03/14/2023 0-5  0 - 5 /hpf Final    Epithelial cells 03/14/2023 FEW  FEW /lpf Final    Epithelial cell category consists of squamous cells and /or transitional urothelial cells. Renal tubular cells, if present, are separately identified as such.     Bacteria 03/14/2023 Negative  NEG /hpf Final    Hyaline cast 03/14/2023 0-2  0 - 5 /lpf Final    Hemoglobin A1c 03/14/2023 5.4  4.0 - 5.6 % Final    Comment: NEW METHOD  PLEASE NOTE NEW REFERENCE RANGE  (NOTE)  HbA1C Interpretive Ranges  <5.7 Normal  5.7 - 6.4         Consider Prediabetes  >6.5              Consider Diabetes      Est. average glucose 03/14/2023 108  mg/dL Final    Special Requests: 03/14/2023 NO SPECIAL REQUESTS    Final    Culture result: 03/14/2023 MRSA NOT PRESENT    Final    Culture result: 03/14/2023     Final                    Value:Screening of patient nares for MRSA is for surveillance purposes and, if positive, to facilitate isolation considerations in high risk settings. It is not intended for automatic decolonization interventions per se as regimens are not sufficiently effective to warrant routine use. Skin:     Denies open wounds, cuts, sores, rashes or other areas of concern in PAT assessment.           Grace Sauceda NP  Available via [facility]

## 2023-03-20 ENCOUNTER — ANESTHESIA (OUTPATIENT)
Dept: SURGERY | Age: 73
End: 2023-03-20
Payer: MEDICARE

## 2023-03-20 ENCOUNTER — ANESTHESIA EVENT (OUTPATIENT)
Dept: SURGERY | Age: 73
End: 2023-03-20
Payer: MEDICARE

## 2023-03-20 ENCOUNTER — HOSPITAL ENCOUNTER (OUTPATIENT)
Age: 73
Discharge: HOME HEALTH | End: 2023-03-21
Attending: ORTHOPAEDIC SURGERY | Admitting: ORTHOPAEDIC SURGERY
Payer: MEDICARE

## 2023-03-20 DIAGNOSIS — M17.11 PRIMARY OSTEOARTHRITIS OF RIGHT KNEE: Primary | ICD-10-CM

## 2023-03-20 LAB
GLUCOSE BLD STRIP.AUTO-MCNC: 108 MG/DL (ref 65–117)
SERVICE CMNT-IMP: NORMAL

## 2023-03-20 PROCEDURE — 74011250637 HC RX REV CODE- 250/637: Performed by: PHYSICIAN ASSISTANT

## 2023-03-20 PROCEDURE — 76210000006 HC OR PH I REC 0.5 TO 1 HR: Performed by: ORTHOPAEDIC SURGERY

## 2023-03-20 PROCEDURE — 74011250636 HC RX REV CODE- 250/636: Performed by: ANESTHESIOLOGY

## 2023-03-20 PROCEDURE — 76010000171 HC OR TIME 2 TO 2.5 HR INTENSV-TIER 1: Performed by: ORTHOPAEDIC SURGERY

## 2023-03-20 PROCEDURE — 2709999900 HC NON-CHARGEABLE SUPPLY: Performed by: ORTHOPAEDIC SURGERY

## 2023-03-20 PROCEDURE — 74011250636 HC RX REV CODE- 250/636

## 2023-03-20 PROCEDURE — 97161 PT EVAL LOW COMPLEX 20 MIN: CPT

## 2023-03-20 PROCEDURE — 97116 GAIT TRAINING THERAPY: CPT

## 2023-03-20 PROCEDURE — 77030012935 HC DRSG AQUACEL BMS -B: Performed by: ORTHOPAEDIC SURGERY

## 2023-03-20 PROCEDURE — 97530 THERAPEUTIC ACTIVITIES: CPT

## 2023-03-20 PROCEDURE — 82962 GLUCOSE BLOOD TEST: CPT

## 2023-03-20 PROCEDURE — 27447 TOTAL KNEE ARTHROPLASTY: CPT | Performed by: ORTHOPAEDIC SURGERY

## 2023-03-20 PROCEDURE — 74011000250 HC RX REV CODE- 250: Performed by: PHYSICIAN ASSISTANT

## 2023-03-20 PROCEDURE — 74011000250 HC RX REV CODE- 250: Performed by: ORTHOPAEDIC SURGERY

## 2023-03-20 PROCEDURE — 27447 TOTAL KNEE ARTHROPLASTY: CPT | Performed by: PHYSICIAN ASSISTANT

## 2023-03-20 PROCEDURE — 77030006822 HC BLD SAW SAG BRSM -B: Performed by: ORTHOPAEDIC SURGERY

## 2023-03-20 PROCEDURE — 74011000250 HC RX REV CODE- 250: Performed by: ANESTHESIOLOGY

## 2023-03-20 PROCEDURE — 74011250636 HC RX REV CODE- 250/636: Performed by: ORTHOPAEDIC SURGERY

## 2023-03-20 PROCEDURE — 77030035236 HC SUT PDS STRATFX BARB J&J -B: Performed by: ORTHOPAEDIC SURGERY

## 2023-03-20 PROCEDURE — 74011250636 HC RX REV CODE- 250/636: Performed by: PHYSICIAN ASSISTANT

## 2023-03-20 PROCEDURE — 77030031139 HC SUT VCRL2 J&J -A: Performed by: ORTHOPAEDIC SURGERY

## 2023-03-20 PROCEDURE — C9290 INJ, BUPIVACAINE LIPOSOME: HCPCS | Performed by: ORTHOPAEDIC SURGERY

## 2023-03-20 PROCEDURE — 74011000272 HC RX REV CODE- 272: Performed by: ORTHOPAEDIC SURGERY

## 2023-03-20 PROCEDURE — 74011000258 HC RX REV CODE- 258: Performed by: ORTHOPAEDIC SURGERY

## 2023-03-20 PROCEDURE — 77030010783 HC BOWL MX BN CEM J&J -B: Performed by: ORTHOPAEDIC SURGERY

## 2023-03-20 PROCEDURE — 77030006835 HC BLD SAW SAG STRY -B: Performed by: ORTHOPAEDIC SURGERY

## 2023-03-20 PROCEDURE — 74011000258 HC RX REV CODE- 258: Performed by: ANESTHESIOLOGY

## 2023-03-20 PROCEDURE — 77030010507 HC ADH SKN DERMBND J&J -B: Performed by: ORTHOPAEDIC SURGERY

## 2023-03-20 PROCEDURE — 20985 CPTR-ASST DIR MS PX: CPT | Performed by: ORTHOPAEDIC SURGERY

## 2023-03-20 PROCEDURE — C1713 ANCHOR/SCREW BN/BN,TIS/BN: HCPCS | Performed by: ORTHOPAEDIC SURGERY

## 2023-03-20 PROCEDURE — C1776 JOINT DEVICE (IMPLANTABLE): HCPCS | Performed by: ORTHOPAEDIC SURGERY

## 2023-03-20 PROCEDURE — 77030002933 HC SUT MCRYL J&J -A: Performed by: ORTHOPAEDIC SURGERY

## 2023-03-20 PROCEDURE — 76060000035 HC ANESTHESIA 2 TO 2.5 HR: Performed by: ORTHOPAEDIC SURGERY

## 2023-03-20 PROCEDURE — 77030000032 HC CUF TRNQT ZIMM -B: Performed by: ORTHOPAEDIC SURGERY

## 2023-03-20 DEVICE — ATTUNE PATELLA MEDIALIZED DOME 35MM CEMENTED AOX
Type: IMPLANTABLE DEVICE | Site: KNEE | Status: FUNCTIONAL
Brand: ATTUNE

## 2023-03-20 DEVICE — ATTUNE KNEE SYSTEM FEMORAL CRUCIATE RETAINING NARROW SIZE 5N RIGHT CEMENTED
Type: IMPLANTABLE DEVICE | Site: KNEE | Status: FUNCTIONAL
Brand: ATTUNE

## 2023-03-20 DEVICE — ATTUNE KNEE SYSTEM TIBIAL BASE FIXED BEARING SIZE 5 CEMENTED
Type: IMPLANTABLE DEVICE | Site: KNEE | Status: FUNCTIONAL
Brand: ATTUNE

## 2023-03-20 DEVICE — KNEE K1 TOT HEMI STD CEM IMPL CAPPED SYNTHES: Type: IMPLANTABLE DEVICE | Site: KNEE | Status: FUNCTIONAL

## 2023-03-20 DEVICE — ATTUNE KNEE SYSTEM TIBIAL INSERT FIXED BEARING MEDIAL STABILIZED RIGHT AOX 5, 5MM
Type: IMPLANTABLE DEVICE | Site: KNEE | Status: FUNCTIONAL
Brand: ATTUNE

## 2023-03-20 DEVICE — SMARTSET GHV GENTAMICIN HIGH VISCOSITY BONE CEMENT 40G
Type: IMPLANTABLE DEVICE | Site: KNEE | Status: FUNCTIONAL
Brand: SMARTSET

## 2023-03-20 RX ORDER — ACETAMINOPHEN 500 MG
1000 TABLET ORAL ONCE
Status: COMPLETED | OUTPATIENT
Start: 2023-03-20 | End: 2023-03-20

## 2023-03-20 RX ORDER — SODIUM CHLORIDE 0.9 % (FLUSH) 0.9 %
5-40 SYRINGE (ML) INJECTION EVERY 8 HOURS
Status: DISCONTINUED | OUTPATIENT
Start: 2023-03-20 | End: 2023-03-21 | Stop reason: HOSPADM

## 2023-03-20 RX ORDER — ACETAMINOPHEN 325 MG/1
650 TABLET ORAL EVERY 6 HOURS
Status: DISCONTINUED | OUTPATIENT
Start: 2023-03-20 | End: 2023-03-21 | Stop reason: HOSPADM

## 2023-03-20 RX ORDER — FAMOTIDINE 20 MG/1
20 TABLET, FILM COATED ORAL 2 TIMES DAILY
Status: DISCONTINUED | OUTPATIENT
Start: 2023-03-20 | End: 2023-03-21 | Stop reason: HOSPADM

## 2023-03-20 RX ORDER — ROPIVACAINE HYDROCHLORIDE 5 MG/ML
INJECTION, SOLUTION EPIDURAL; INFILTRATION; PERINEURAL
Status: COMPLETED | OUTPATIENT
Start: 2023-03-20 | End: 2023-03-20

## 2023-03-20 RX ORDER — HYDROMORPHONE HYDROCHLORIDE 1 MG/ML
0.2 INJECTION, SOLUTION INTRAMUSCULAR; INTRAVENOUS; SUBCUTANEOUS
Status: DISCONTINUED | OUTPATIENT
Start: 2023-03-20 | End: 2023-03-20 | Stop reason: HOSPADM

## 2023-03-20 RX ORDER — LIDOCAINE HYDROCHLORIDE 10 MG/ML
0.1 INJECTION, SOLUTION EPIDURAL; INFILTRATION; INTRACAUDAL; PERINEURAL AS NEEDED
Status: DISCONTINUED | OUTPATIENT
Start: 2023-03-20 | End: 2023-03-20 | Stop reason: HOSPADM

## 2023-03-20 RX ORDER — POLYETHYLENE GLYCOL 3350 17 G/17G
17 POWDER, FOR SOLUTION ORAL DAILY
Status: DISCONTINUED | OUTPATIENT
Start: 2023-03-20 | End: 2023-03-21 | Stop reason: HOSPADM

## 2023-03-20 RX ORDER — SODIUM CHLORIDE 9 MG/ML
125 INJECTION, SOLUTION INTRAVENOUS CONTINUOUS
Status: DISPENSED | OUTPATIENT
Start: 2023-03-20 | End: 2023-03-21

## 2023-03-20 RX ORDER — SODIUM CHLORIDE 9 MG/ML
INJECTION, SOLUTION INTRAVENOUS
Status: DISCONTINUED | OUTPATIENT
Start: 2023-03-20 | End: 2023-03-20 | Stop reason: HOSPADM

## 2023-03-20 RX ORDER — SODIUM CHLORIDE 0.9 % (FLUSH) 0.9 %
5-40 SYRINGE (ML) INJECTION AS NEEDED
Status: DISCONTINUED | OUTPATIENT
Start: 2023-03-20 | End: 2023-03-21 | Stop reason: HOSPADM

## 2023-03-20 RX ORDER — FACIAL-BODY WIPES
10 EACH TOPICAL DAILY PRN
Status: DISCONTINUED | OUTPATIENT
Start: 2023-03-20 | End: 2023-03-21 | Stop reason: HOSPADM

## 2023-03-20 RX ORDER — DEXAMETHASONE SODIUM PHOSPHATE 10 MG/ML
10 INJECTION INTRAMUSCULAR; INTRAVENOUS ONCE
Status: DISCONTINUED | OUTPATIENT
Start: 2023-03-20 | End: 2023-03-20 | Stop reason: HOSPADM

## 2023-03-20 RX ORDER — PREGABALIN 75 MG/1
75 CAPSULE ORAL ONCE
Status: COMPLETED | OUTPATIENT
Start: 2023-03-20 | End: 2023-03-20

## 2023-03-20 RX ORDER — SODIUM CHLORIDE 0.9 % (FLUSH) 0.9 %
5-40 SYRINGE (ML) INJECTION EVERY 8 HOURS
Status: DISCONTINUED | OUTPATIENT
Start: 2023-03-20 | End: 2023-03-20 | Stop reason: HOSPADM

## 2023-03-20 RX ORDER — KETOROLAC TROMETHAMINE 30 MG/ML
15 INJECTION, SOLUTION INTRAMUSCULAR; INTRAVENOUS EVERY 6 HOURS
Status: COMPLETED | OUTPATIENT
Start: 2023-03-20 | End: 2023-03-21

## 2023-03-20 RX ORDER — FENTANYL CITRATE 50 UG/ML
25 INJECTION, SOLUTION INTRAMUSCULAR; INTRAVENOUS
Status: DISCONTINUED | OUTPATIENT
Start: 2023-03-20 | End: 2023-03-20 | Stop reason: HOSPADM

## 2023-03-20 RX ORDER — MIDAZOLAM HYDROCHLORIDE 1 MG/ML
1 INJECTION, SOLUTION INTRAMUSCULAR; INTRAVENOUS AS NEEDED
Status: DISCONTINUED | OUTPATIENT
Start: 2023-03-20 | End: 2023-03-20 | Stop reason: HOSPADM

## 2023-03-20 RX ORDER — ONDANSETRON 2 MG/ML
INJECTION INTRAMUSCULAR; INTRAVENOUS AS NEEDED
Status: DISCONTINUED | OUTPATIENT
Start: 2023-03-20 | End: 2023-03-20 | Stop reason: HOSPADM

## 2023-03-20 RX ORDER — DEXAMETHASONE SODIUM PHOSPHATE 4 MG/ML
INJECTION, SOLUTION INTRA-ARTICULAR; INTRALESIONAL; INTRAMUSCULAR; INTRAVENOUS; SOFT TISSUE AS NEEDED
Status: DISCONTINUED | OUTPATIENT
Start: 2023-03-20 | End: 2023-03-20 | Stop reason: HOSPADM

## 2023-03-20 RX ORDER — DIPHENHYDRAMINE HYDROCHLORIDE 50 MG/ML
12.5 INJECTION, SOLUTION INTRAMUSCULAR; INTRAVENOUS AS NEEDED
Status: DISCONTINUED | OUTPATIENT
Start: 2023-03-20 | End: 2023-03-20 | Stop reason: HOSPADM

## 2023-03-20 RX ORDER — SODIUM CHLORIDE 9 MG/ML
1000 INJECTION, SOLUTION INTRAVENOUS CONTINUOUS
Status: DISCONTINUED | OUTPATIENT
Start: 2023-03-20 | End: 2023-03-20 | Stop reason: HOSPADM

## 2023-03-20 RX ORDER — NALOXONE HYDROCHLORIDE 0.4 MG/ML
0.4 INJECTION, SOLUTION INTRAMUSCULAR; INTRAVENOUS; SUBCUTANEOUS AS NEEDED
Status: DISCONTINUED | OUTPATIENT
Start: 2023-03-20 | End: 2023-03-21 | Stop reason: HOSPADM

## 2023-03-20 RX ORDER — SODIUM CHLORIDE, SODIUM LACTATE, POTASSIUM CHLORIDE, CALCIUM CHLORIDE 600; 310; 30; 20 MG/100ML; MG/100ML; MG/100ML; MG/100ML
INJECTION, SOLUTION INTRAVENOUS
Status: DISCONTINUED | OUTPATIENT
Start: 2023-03-20 | End: 2023-03-20 | Stop reason: HOSPADM

## 2023-03-20 RX ORDER — ACETAMINOPHEN 325 MG/1
650 TABLET ORAL ONCE
Status: DISCONTINUED | OUTPATIENT
Start: 2023-03-20 | End: 2023-03-20 | Stop reason: HOSPADM

## 2023-03-20 RX ORDER — AMOXICILLIN 250 MG
1 CAPSULE ORAL 2 TIMES DAILY
Status: DISCONTINUED | OUTPATIENT
Start: 2023-03-20 | End: 2023-03-21 | Stop reason: HOSPADM

## 2023-03-20 RX ORDER — HYDROXYZINE HYDROCHLORIDE 10 MG/1
10 TABLET, FILM COATED ORAL
Status: DISCONTINUED | OUTPATIENT
Start: 2023-03-20 | End: 2023-03-21 | Stop reason: HOSPADM

## 2023-03-20 RX ORDER — SODIUM CHLORIDE 9 MG/ML
50 INJECTION, SOLUTION INTRAVENOUS CONTINUOUS
Status: DISCONTINUED | OUTPATIENT
Start: 2023-03-20 | End: 2023-03-20 | Stop reason: HOSPADM

## 2023-03-20 RX ORDER — OXYCODONE HYDROCHLORIDE 5 MG/1
5 TABLET ORAL
Status: DISCONTINUED | OUTPATIENT
Start: 2023-03-20 | End: 2023-03-21 | Stop reason: HOSPADM

## 2023-03-20 RX ORDER — OXYCODONE AND ACETAMINOPHEN 5; 325 MG/1; MG/1
1 TABLET ORAL AS NEEDED
Status: DISCONTINUED | OUTPATIENT
Start: 2023-03-20 | End: 2023-03-20 | Stop reason: HOSPADM

## 2023-03-20 RX ORDER — ONDANSETRON 2 MG/ML
4 INJECTION INTRAMUSCULAR; INTRAVENOUS
Status: ACTIVE | OUTPATIENT
Start: 2023-03-20 | End: 2023-03-21

## 2023-03-20 RX ORDER — FENTANYL CITRATE 50 UG/ML
50 INJECTION, SOLUTION INTRAMUSCULAR; INTRAVENOUS AS NEEDED
Status: COMPLETED | OUTPATIENT
Start: 2023-03-20 | End: 2023-03-20

## 2023-03-20 RX ORDER — MORPHINE SULFATE 2 MG/ML
2 INJECTION, SOLUTION INTRAMUSCULAR; INTRAVENOUS
Status: DISCONTINUED | OUTPATIENT
Start: 2023-03-20 | End: 2023-03-20 | Stop reason: HOSPADM

## 2023-03-20 RX ORDER — TRAMADOL HYDROCHLORIDE 50 MG/1
50 TABLET ORAL
Status: DISCONTINUED | OUTPATIENT
Start: 2023-03-20 | End: 2023-03-21 | Stop reason: HOSPADM

## 2023-03-20 RX ORDER — ONDANSETRON 2 MG/ML
4 INJECTION INTRAMUSCULAR; INTRAVENOUS AS NEEDED
Status: DISCONTINUED | OUTPATIENT
Start: 2023-03-20 | End: 2023-03-20 | Stop reason: HOSPADM

## 2023-03-20 RX ORDER — MIDAZOLAM HYDROCHLORIDE 1 MG/ML
0.5 INJECTION, SOLUTION INTRAMUSCULAR; INTRAVENOUS
Status: DISCONTINUED | OUTPATIENT
Start: 2023-03-20 | End: 2023-03-20 | Stop reason: HOSPADM

## 2023-03-20 RX ORDER — ROSUVASTATIN CALCIUM 10 MG/1
5 TABLET, COATED ORAL
Status: DISCONTINUED | OUTPATIENT
Start: 2023-03-20 | End: 2023-03-21 | Stop reason: HOSPADM

## 2023-03-20 RX ORDER — HYDROMORPHONE HYDROCHLORIDE 1 MG/ML
0.5 INJECTION, SOLUTION INTRAMUSCULAR; INTRAVENOUS; SUBCUTANEOUS
Status: ACTIVE | OUTPATIENT
Start: 2023-03-20 | End: 2023-03-21

## 2023-03-20 RX ORDER — SODIUM CHLORIDE 0.9 % (FLUSH) 0.9 %
5-40 SYRINGE (ML) INJECTION AS NEEDED
Status: DISCONTINUED | OUTPATIENT
Start: 2023-03-20 | End: 2023-03-20 | Stop reason: HOSPADM

## 2023-03-20 RX ORDER — MIRTAZAPINE 15 MG/1
30 TABLET, FILM COATED ORAL
Status: DISCONTINUED | OUTPATIENT
Start: 2023-03-20 | End: 2023-03-21 | Stop reason: HOSPADM

## 2023-03-20 RX ORDER — SODIUM CHLORIDE, SODIUM LACTATE, POTASSIUM CHLORIDE, CALCIUM CHLORIDE 600; 310; 30; 20 MG/100ML; MG/100ML; MG/100ML; MG/100ML
125 INJECTION, SOLUTION INTRAVENOUS CONTINUOUS
Status: DISCONTINUED | OUTPATIENT
Start: 2023-03-20 | End: 2023-03-20 | Stop reason: HOSPADM

## 2023-03-20 RX ORDER — PROPOFOL 10 MG/ML
INJECTION, EMULSION INTRAVENOUS
Status: DISCONTINUED | OUTPATIENT
Start: 2023-03-20 | End: 2023-03-20 | Stop reason: HOSPADM

## 2023-03-20 RX ORDER — ASPIRIN 81 MG/1
81 TABLET ORAL 2 TIMES DAILY
Status: DISCONTINUED | OUTPATIENT
Start: 2023-03-20 | End: 2023-03-21 | Stop reason: HOSPADM

## 2023-03-20 RX ADMIN — MIRTAZAPINE 30 MG: 15 TABLET, FILM COATED ORAL at 21:55

## 2023-03-20 RX ADMIN — ASPIRIN 81 MG: 81 TABLET, COATED ORAL at 17:13

## 2023-03-20 RX ADMIN — ACETAMINOPHEN 1000 MG: 500 TABLET ORAL at 06:59

## 2023-03-20 RX ADMIN — ACETAMINOPHEN 650 MG: 325 TABLET ORAL at 12:00

## 2023-03-20 RX ADMIN — SODIUM CHLORIDE, POTASSIUM CHLORIDE, SODIUM LACTATE AND CALCIUM CHLORIDE: 600; 310; 30; 20 INJECTION, SOLUTION INTRAVENOUS at 07:20

## 2023-03-20 RX ADMIN — ONDANSETRON HYDROCHLORIDE 4 MG: 2 INJECTION, SOLUTION INTRAMUSCULAR; INTRAVENOUS at 08:00

## 2023-03-20 RX ADMIN — TRANEXAMIC ACID 1 G: 100 INJECTION, SOLUTION INTRAVENOUS at 08:52

## 2023-03-20 RX ADMIN — CEFAZOLIN 2 G: 1 INJECTION, POWDER, FOR SOLUTION INTRAMUSCULAR; INTRAVENOUS at 17:13

## 2023-03-20 RX ADMIN — WATER 2 G: 1 INJECTION INTRAMUSCULAR; INTRAVENOUS; SUBCUTANEOUS at 07:53

## 2023-03-20 RX ADMIN — MIDAZOLAM 2 MG: 1 INJECTION INTRAMUSCULAR; INTRAVENOUS at 07:11

## 2023-03-20 RX ADMIN — SODIUM CHLORIDE: 9 INJECTION, SOLUTION INTRAVENOUS at 08:59

## 2023-03-20 RX ADMIN — SODIUM CHLORIDE 125 ML/HR: 9 INJECTION, SOLUTION INTRAVENOUS at 17:13

## 2023-03-20 RX ADMIN — SODIUM CHLORIDE, POTASSIUM CHLORIDE, SODIUM LACTATE AND CALCIUM CHLORIDE 125 ML/HR: 600; 310; 30; 20 INJECTION, SOLUTION INTRAVENOUS at 06:59

## 2023-03-20 RX ADMIN — ACETAMINOPHEN 650 MG: 325 TABLET ORAL at 17:14

## 2023-03-20 RX ADMIN — DOCUSATE SODIUM 50MG AND SENNOSIDES 8.6MG 1 TABLET: 8.6; 5 TABLET, FILM COATED ORAL at 12:01

## 2023-03-20 RX ADMIN — ROPIVACAINE HYDROCHLORIDE 25 ML: 5 INJECTION, SOLUTION EPIDURAL; INFILTRATION; PERINEURAL at 07:21

## 2023-03-20 RX ADMIN — PROPOFOL 75 MCG/KG/MIN: 10 INJECTION, EMULSION INTRAVENOUS at 07:23

## 2023-03-20 RX ADMIN — FAMOTIDINE 20 MG: 20 TABLET ORAL at 12:01

## 2023-03-20 RX ADMIN — TRANEXAMIC ACID 1 G: 100 INJECTION, SOLUTION INTRAVENOUS at 08:00

## 2023-03-20 RX ADMIN — SODIUM CHLORIDE 125 ML/HR: 9 INJECTION, SOLUTION INTRAVENOUS at 10:30

## 2023-03-20 RX ADMIN — DOCUSATE SODIUM 50MG AND SENNOSIDES 8.6MG 1 TABLET: 8.6; 5 TABLET, FILM COATED ORAL at 17:13

## 2023-03-20 RX ADMIN — DEXMEDETOMIDINE HYDROCHLORIDE 8 MCG: 100 INJECTION, SOLUTION, CONCENTRATE INTRAVENOUS at 07:52

## 2023-03-20 RX ADMIN — ROSUVASTATIN 5 MG: 10 TABLET, FILM COATED ORAL at 21:55

## 2023-03-20 RX ADMIN — SODIUM CHLORIDE, PRESERVATIVE FREE 10 ML: 5 INJECTION INTRAVENOUS at 21:55

## 2023-03-20 RX ADMIN — FAMOTIDINE 20 MG: 20 TABLET ORAL at 17:14

## 2023-03-20 RX ADMIN — KETOROLAC TROMETHAMINE 15 MG: 30 INJECTION, SOLUTION INTRAMUSCULAR at 17:13

## 2023-03-20 RX ADMIN — DEXAMETHASONE SODIUM PHOSPHATE 4 MG: 4 INJECTION, SOLUTION INTRAMUSCULAR; INTRAVENOUS at 08:00

## 2023-03-20 RX ADMIN — ASPIRIN 81 MG: 81 TABLET, COATED ORAL at 12:01

## 2023-03-20 RX ADMIN — PREGABALIN 75 MG: 75 CAPSULE ORAL at 06:59

## 2023-03-20 RX ADMIN — LOSARTAN POTASSIUM: 50 TABLET, FILM COATED ORAL at 21:55

## 2023-03-20 RX ADMIN — SODIUM CHLORIDE, PRESERVATIVE FREE 10 ML: 5 INJECTION INTRAVENOUS at 14:31

## 2023-03-20 RX ADMIN — FENTANYL CITRATE 50 MCG: 50 INJECTION, SOLUTION INTRAMUSCULAR; INTRAVENOUS at 07:15

## 2023-03-20 RX ADMIN — FENTANYL CITRATE 50 MCG: 50 INJECTION, SOLUTION INTRAMUSCULAR; INTRAVENOUS at 07:12

## 2023-03-20 NOTE — ROUTINE PROCESS
Patient: Dolores Lovell MRN: [id number]  SSN: [id number]   YOB: 1950  Age: 74 y.o. Sex: female     Patient is status post Procedure(s):  RIGHT TOTAL KNEE ARTHROPLASTY. Surgeon(s) and Role:     * Yi Horta MD - Primary    Local/Dose/Irrigation:  See eMAR                  Peripheral IV 03/19/23 Anterior;Distal;Right Forearm (Active)            Airway - Endotracheal Tube 03/20/23 (Active)                   Dressing/Packing:  Incision 03/20/23 Knee Right-Dressing/Treatment: Surgical glue; Alginate with Ag;Cast padding; Ace wrap (03/20/23 0700)    Splint/Cast:     Other:

## 2023-03-20 NOTE — ANESTHESIA PREPROCEDURE EVALUATION
Relevant Problems   No relevant active problems       Anesthetic History   No history of anesthetic complications            Review of Systems / Medical History  Patient summary reviewed, nursing notes reviewed and pertinent labs reviewed    Pulmonary  Within defined limits      Sleep apnea           Neuro/Psych   Within defined limits           Cardiovascular  Within defined limits  Hypertension                   GI/Hepatic/Renal  Within defined limits              Endo/Other  Within defined limits           Other Findings              Physical Exam    Airway  Mallampati: II  TM Distance: > 6 cm  Neck ROM: normal range of motion   Mouth opening: Normal     Cardiovascular  Regular rate and rhythm,  S1 and S2 normal,  no murmur, click, rub, or gallop             Dental  No notable dental hx       Pulmonary  Breath sounds clear to auscultation               Abdominal  GI exam deferred       Other Findings            Anesthetic Plan    ASA: 2  Anesthesia type: spinal      Post-op pain plan if not by surgeon: peripheral nerve block single      Anesthetic plan and risks discussed with: Patient

## 2023-03-20 NOTE — PERIOP NOTES
TRANSFER - OUT REPORT:    Verbal report given to [facility], RN on E. HANG. daniel Wong  being transferred to [facility],  556 for routine post - op       Report consisted of patients Situation, Background, Assessment and   Recommendations(SBAR). Time Pre op antibiotic given:7:53  Anesthesia Stop time: 9:37    Information from the following report(s) SBAR, Kardex, Procedure Summary, Intake/Output, MAR, and Cardiac Rhythm NSR/Sinus Bradycardia  was reviewed with the receiving nurse. Opportunity for questions and clarification was provided. Is the patient on 02? NO    Is the patient on a monitor? NO    Is the nurse transporting with the patient? NO    Surgical Waiting Area notified of patient's transfer from PACU? YES      The following personal items collected during your admission accompanied patient upon transfer:   Dental Appliance: Dental Appliances: None  Vision: Visual Aid: Glasses Glasses returned in PACU, placed on stretcher. Hearing Aid:    Jewelry: Jewelry: None  Clothing: Clothing: Shirt, Footwear, Socks, Pants, Undergarments Clothing/belonging bag returned in PACU. Other Valuables:  Other Valuables: Eyeglasses  Valuables sent to safe:

## 2023-03-20 NOTE — DISCHARGE INSTRUCTIONS
Discharge Instructions Knee Replacement  Dr. Karsten Holloway  Patient Name  ANAYELI Camacho  Date of procedure  3/20/2023    Procedure  Procedure(s):  RIGHT TOTAL KNEE ARTHROPLASTY  Surgeon  Surgeon(s) and Role:     * Noris Mcgee MD - Primary  Date of discharge: [unfilled]  PCP: @PCP@    Follow up care  Follow up visit with Dr. Karsten Holloway in 4 weeks. Call [phone] [place]) to make an appointment. If Home Health has been arranged for you, they will call you to arrange dates/times for visits. Call them if you do not hear from them within 24 hours after you go home. Activity at home  Take a short walk every hour; except at night when sleeping. Do your Home Exercise Program 3 times every day. After exercising lie down and elevate your leg on pillows for 15-30 minutes to decrease swelling. Refer to your patient notebook for more information. Bathing and caring for your incision  You may take a shower with your waterproof dressing on your knee. The waterproof dressing is to stay on your knee for 7 days. On the 7th day have someone gently peel the dressing off by lifting the edge and stretching it to break the seal.  You may then leave your incision open to air unless you see drainage from your knee. Preventing blood clots  Take Aspirin 81mg twice each day for one month (30 days) following surgery. Call Dr. Karsten Holloway for signs of a blood clot in your leg: calf pain, tenderness, redness, swelling of lower leg   Preventing lung congestion  Use your incentive spirometer 4 times a day; do 10 repetitions each time  Remember to keep the small blue ball between the two arrows when taking a slow, deep breath   Pain Management  Get up and walk a short distance to relieve pain and stiffness. Place ice wrap on your knee except when you are walking. The gel ice packs should be changed about every 4 hours. Elevate your leg on pillows for 15-30 minutes.    Pain Medications  Take Tylenol 650mg (take two 325mg tablets) every 6 hours for the next 4 weeks. If needed, take Tramadol (narcotic pain pill) every 6 hours as prescribed. Take only if needed and stop once your pain is tolerable. Take all medications with a small amount of food. As your pain decreases, take the narcotics less often or take ½ of a pill. Call Dr. Kevin Yanez if you have side effects from your narcotic pain medication: itching, drowsiness, dizziness, upset stomach, dry mouth, constipation or if you medication is not relieving your pain. Diet after surgery  You may resume your normal diet. Include vegetables, fruit, whole grains, lean meats, and low-fat dairy products. Eat food high in fiber. Drink plenty of fluids, including 8 cups of water daily. Take a stool softener (Senokot-s or Colace) to prevent constipation. If constipation occurs you may take a laxative (Milk of Magnesia, Dulcolax tablets). Avoid after surgery  Do not take any over-the-counter medication for pain except Tylenol  Do not take more than 3000mg (3 Grams) of Tylenol in 24 hours  Do not drink alcoholic beverages  Do not smoke  Do not drive until seen for follow up appointment  Do not place frozen gel pack directly on your skin. It can cause frostbite. Do not take a tub bath, swim or get in a hot tub for 8 weeks  Prevention of falls and safety at home  Set up an area where you can rest comfortably leaving space around furniture to allow you to walk with your walker. Keep stairs, hallways and bathrooms well lit; especially at night. Arrange for care for your pets  Keep your home free of clutter.    Call Dr. Kevin Yanez at [phone] for:  Pain that is not relieved by pain medication, ice and activity  Side effects of medications  Increased/spread of bruising  Warning signs of infection:  persistent fever greater than 100 degrees  shaking or chills  increased redness, tenderness, swelling or drainage from incision  increased pain during activity or rest  Warning signs of a blood clot in your leg:  increased pain in your calf  tenderness or redness  increased swelling or knee, calf, ankle or foot    Call [phone] after 5pm or on a weekend.  The on call physician will return your phone call  Call your Primary Care Doctor for:   Concerns about your medical conditions such as diabetes, high blood pressure, asthma, congestive heart failure  Blood sugars greater than 180  Persistent headache or dizziness  Coughing or congestion  Constipation or diarrhea  Burning when you go to the bathroom  Abnormal heart rate (fast or  slow)      Call 911 and go to the nearest hospital for:   Sudden increased shortness of breath  Sudden onset of chest pain  Difficulty breathing  Localized chest pain with coughing or taking a deep breath

## 2023-03-20 NOTE — OP NOTES
Name: Skye Bennett  MRN:  [id number]  : 1950  Age:  74 y.o. Surgery Date: 3/20/2023      OPERATIVE REPORT - RIGHT TOTAL KNEE REPLACEMENT-MIDVASTUS    PREOPERATIVE DIAGNOSIS: Osteoarthritis, right knee. POSTOPERATIVE DIAGNOSIS: Osteoarthritis, right knee. PROCEDURE PERFORMED: Computer assisted Right total knee arthroplasty [employer] Robot    SURGEON: Ismael Hamlin MD    FIRST ASSISTANT:  Odella Fothergill, PA-C    ANESTHESIA: Spinal    PRE-OP ANTIBIOTIC: Ancef 2g    COMPLICATIONS: None. ESTIMATED BLOOD LOSS: 100 mL. SPECIMENS REMOVED: None. COMPONENTS IMPLANTED:   Implant Name Type Inv. Item Serial No.  Lot No. LRB No. Used Action   CEMENT BNE 40GM FULL DOSE PMMA W/ GENT HI VISC RADPQ LNG - SN/A  CEMENT BNE 40GM FULL DOSE PMMA W/ GENT HI VISC RADPQ LNG N/A [employer]_ [id number] Right 2 Implanted   PAT BROOKE DOME MEDIAL 35MM -- ATTUNE - SN/A  PAT BROOKE DOME MEDIAL 35MM -- ATTUNE N/A [employer] ORTHOPEDICS_ [id number] Right 1 Implanted   FEM CR RT JOSE J SZ 5 BROOKE -- ATTUNE - SN/A  FEM CR RT JOSE J SZ 5 BROOKE -- ATTUNE N/A [employer] ORTHOPEDICS_ [id number] Right 1 Implanted   BASEPLATE TIB SZ 5 FIX BEAR BROOKE S+ TECHNOLOGY ATTUNE - SN/A  BASEPLATE TIB SZ 5 FIX BEAR BROOKE S+ TECHNOLOGY ATTUNE N/A [employer] ORTHOPED[employer]_ [id number] Right 1 Implanted   INSERT TIB FIX BEAR 5 5 MM RT MEDL KNEE STBL AOX ATTUNE - SN/A  INSERT TIB FIX BEAR 5 5 MM RT MEDL KNEE STBL AOX ATTUNE N/A [employer] ORTHOPEDICS_ [id number] Right 1 Implanted       INDICATIONS: The patient is an 74 yrs female with progressive debilitating right knee pain due to severe osteoarthritis. Symptoms have progressed despite comprehensive conservative treatment and they presents for right total knee replacement. Risks, benefits, alternatives of the procedure were reviewed in detail and the patient desired to proceed.  Risks including bleeding, infection, damage to surrounding structures, blood clots, pulmonary embolism, and death were discussed. The patient understood understands the increased risk for perioperative medical complications due to their medical comorbidities. Intra-operative ROM revealed -3 degrees flexion contracture and 7 degrees varus. ROM with trials in place revealed 0 degrees flexion contracture and 4 degrees varus. DESCRIPTION OF PROCEDURE:DESCRIPTION OF PROCEDURE: Epidural/spinal anesthesia was initiated. Two grams of cefazolin were administered within 30 minutes of incision. The right lower extremity was prepped and draped in the usual sterile fashion. The skin was covered with Ioban occlusive dressing. A tourniquet was only employed for cementation- total time- 10 minutes. A midline skin incision was made with a 10 blade and small flaps were elevated. A MIDVASTUS arthrotomy was made to the knee. I released the ACL and menisci and performed a limited medial release. I then obtained all anatomic landmarks using the [employer] US system. The tibia was subluxed and I carried out a tibial resection with approximately 2-3 mm from the low side . The meniscal remnants were removed. I then placed the tensor  and took the knee through a range of motion. I utlized the balance curve to modify our femoral cuts. Anterior, posterior, and chamfer cuts were carried out using the [employer] robot. I then prepared the femur for the planned size and drilled lug holes. The tibial was then sized and correct rotation was identified using the medial 1/3 of the tibial tubercle as a landmark. The tibia was prepared using a drill followed by a keel punch. Trials were placed. The patella was sized using a caliper and an appropriate resection  was performed. Lug holes were drilled and a trial was fitted. The knee tracked well with all trial implants. The trials were then removed. Bony surfaces were drilled, lavaged, and dried. All components were cemented. Excess cement was removed.  Polyethylene component was placed. After the cement had fully cured, the knee was ranged and  irrigated copiously with pulsatile lavage. All surrounding soft tissues were infiltrated with local anesthetic. The arthrotomy  was closed with running quill suture and 0 vicryl suture. Skin and subcutaneous tissues were irrigated and closed in standard fashion with 2-0 vicryl and 3-0 monocryl. An aquacel dressing was placed. The patient underwent straight catheterization at the end of the case. Dannielle Ahumada was critical for moore portions of the case including retractor management, wound closure, and dressing application. There was no one else available to perform these specific duties. There were no complications. The procedure was a robotic assisted  RIGHT TOTAL KNEE REPLACEMENT. No specimens were obtained/sent. The patient was transferred to the recovery room in stable condition.       Ava Guzman MD

## 2023-03-20 NOTE — ANESTHESIA PROCEDURE NOTES
Peripheral Block    Start time: 3/20/2023 7:11 AM  End time: 3/20/2023 7:21 AM  Performed by: Vanda Robert MD  Authorized by: Vanda Robert MD       Pre-procedure: Indications: at surgeon's request and post-op pain management    Preanesthetic Checklist: patient identified, risks and benefits discussed, site marked, timeout performed, anesthesia consent given, patient being monitored and fire risk safety assessment completed and verbalized      Block Type:   Block Type:   Adductor canal  Laterality:  Right  Monitoring:  Standard ASA monitoring, continuous pulse ox, frequent vital sign checks, heart rate, responsive to questions and oxygen  Injection Technique:  Single shot  Procedures: ultrasound guided    Patient Position: supine  Prep: DuraPrep    Needle Type:  Stimuplex  Needle Gauge:  22 G  Needle Localization:  Ultrasound guidance  Medication Injected:  Ropivacaine (PF) (NAROPIN)(0.5%) 5 mg/mL injection - Peripheral Nerve Block   25 mL - 3/20/2023 7:21:00 AM  Med Admin Time: 3/20/2023 7:21 AM    Assessment:  Number of attempts:  1  Injection Assessment:  Incremental injection every 5 mL, local visualized surrounding nerve on ultrasound, negative aspiration for blood, no paresthesia and no intravascular symptoms  Patient tolerance:  Patient tolerated the procedure well with no immediate complications

## 2023-03-20 NOTE — PROGRESS NOTES
03/20/23 0805   Family Communication   Family Update Message Procedure started   Delivery Origin Nurse    Relationship to Patient Spouse  (Jen Saha)    Phone Number [phone]   Family/Significant Other Update Called 11-Jan-2017 18:03

## 2023-03-20 NOTE — ANESTHESIA POSTPROCEDURE EVALUATION
Procedure(s):  RIGHT TOTAL KNEE ARTHROPLASTY. spinal    Anesthesia Post Evaluation      Multimodal analgesia: multimodal analgesia used between 6 hours prior to anesthesia start to PACU discharge  Patient location during evaluation: PACU  Patient participation: complete - patient participated  Level of consciousness: awake  Pain score: 2  Pain management: adequate  Airway patency: patent  Anesthetic complications: no  Cardiovascular status: acceptable  Respiratory status: acceptable  Hydration status: acceptable  Comments: I have evaluated the patient and meets criteria for discharge from PACU. Luis Johnson MD  Post anesthesia nausea and vomiting:  controlled  Final Post Anesthesia Temperature Assessment:  Normothermia (36.0-37.5 degrees C)      INITIAL Post-op Vital signs:   Vitals Value Taken Time   /56 03/20/23 0950   Temp 36.4 °C (97.6 °F) 03/20/23 0936   Pulse 61 03/20/23 0955   Resp 12 03/20/23 0955   SpO2 96 % 03/20/23 0955   Vitals shown include unvalidated device data.

## 2023-03-20 NOTE — H&P
Date of Surgery Update:  Guillermina Poole was seen and examined. History and physical has been reviewed. The patient has been examined. There have been no significant clinical changes since the completion of the originally dated History and Physical.  Patient identified by surgeon; surgical site was confirmed by patient and surgeon.     Signed By: Susy Cadena MD     March 20, 2023 7:39 AM

## 2023-03-20 NOTE — PROGRESS NOTES
Care Plan reviewed and all appropriate interventions implemented. Problem: Falls - Risk of  Goal: *Absence of Falls  Description: Document Bernardino Erps Fall Risk and appropriate interventions in the flowsheet.   Outcome: Progressing Towards Goal  Note: Fall Risk Interventions:                                Problem: Patient Education: Go to Patient Education Activity  Goal: Patient/Family Education  Outcome: Progressing Towards Goal

## 2023-03-20 NOTE — PROGRESS NOTES
Problem: Mobility Impaired (Adult and Pediatric)  Goal: *Acute Goals and Plan of Care (Insert Text)  Description: FUNCTIONAL STATUS PRIOR TO ADMISSION: Patient was independent and active without use of DME.    HOME SUPPORT PRIOR TO ADMISSION: The patient lived with  but did not require assist.    Physical Therapy Goals  Initiated 3/20/2023    1. Patient will move from supine to sit and sit to supine , scoot up and down, and roll side to side in bed with modified independence within 4 days. 2. Patient will perform sit to stand with modified independence within 4 days. 3. Patient will ambulate with modified independence for 150 feet with the least restrictive device within 4 days. 4. Patient will ascend/descend 4 stairs with cane and one handrail(s) with modified independence within 4 days. 5. Patient will perform home exercise program per protocol with independence within 4 days. 6. Patient will demonstrate AROM 0-90 degrees in operative joint within 4 days. 3/20/2023 1327 by Everton Rivera  Outcome: Progressing Towards Goal   PHYSICAL THERAPY EVALUATION  Patient: Cynthia Alonso (66 y.o. female)  Date: 3/20/2023  Primary Diagnosis: Primary osteoarthritis of right knee [M17.11]  Procedure(s) (LRB):  RIGHT TOTAL KNEE ARTHROPLASTY (Right) Day of Surgery   Precautions:   Fall, WBAT    ASSESSMENT  Based on the objective data described below, the patient presents with  impairment in functional mobility, activity tolerance and balance s/p R TKA. PLOF: Independent with ADLs and IADLs. Patient lives with  in a one story home with 2 steps with rail to enter. Patient's mobility was on target for POD#0. Will address more exercises, increase gait distance, negotiate stairs and assess for discharge at am PT session tomorrow. Patient instructed NOT to get up from bed, chair or commode without calling for assistance. Initiated post TKA exercise protocol and wrote same on [employer]. Anticipate discharge after am PT session tomorrow. Current Level of Function Impacting Discharge (mobility/balance): Contact guard assistance for all mobility. Ambulated 72 ft with RW and gait belt. Gait slightly antalgic but steady. Functional Outcome Measure: The patient scored 55/100 on the Barthel outcome measure which is indicative of moderate impaired ability to care for basic self-needs/dependency on others. .      Other factors to consider for discharge: Motivated/A & O x 4/Supportive Family/Independent PLOF      Patient will benefit from skilled therapy intervention to address the above noted impairments. PLAN :  Recommendations and Planned Interventions: bed mobility training, transfer training, gait training, therapeutic exercises, patient and family training/education, and therapeutic activities      Frequency/Duration: Patient will be followed by physical therapy:  twice daily to address goals. Recommendation for discharge: (in order for the patient to meet his/her long term goals)  Physical therapy at least 2 days/week in the home     This discharge recommendation:  Has been made in collaboration with the attending provider and/or case management    IF patient discharges home will need the following DME: patient owns DME required for discharge         SUBJECTIVE:   Patient stated I need to get up.     OBJECTIVE DATA SUMMARY:   HISTORY:    Past Medical History:   Diagnosis Date    Cancer ([facility].)     [facility] AND [facility] - MULTIPLE    COVID-19 06/12/2022    GERD (gastroesophageal reflux disease) 1970    Hypertension     Psychiatric disorder     ANXIETY    [facility]     Sleep apnea     CPAP     Past Surgical History:   Procedure Laterality Date    HX APPENDECTOMY  1963    HX CATARACT REMOVAL Right     HX COLONOSCOPY      HX ENDOSCOPY      HX HEART CATHETERIZATION  2020    NO STENT    HX KNEE REPLACEMENT Left 2022       Personal factors and/or comorbidities impacting plan of care: Motivated/A & O x 4/Supportive Family/Independent PLOF     Home Situation  Home Environment: Private residence  # Steps to Enter: 2  Rails to Enter: Yes  Hand Rails : Right  Wheelchair Ramp: No  One/Two Story Residence: One story  Living Alone: No  Support Systems: Spouse/Significant Other  Patient Expects to be Discharged to[de-identified] Home with home health  Current DME Used/Available at Home: Cane, straight, Walker, rolling, Raised toilet seat, Shower chair  Tub or Shower Type: Shower    EXAMINATION/PRESENTATION/DECISION MAKING:   Critical Behavior:  Neurologic State: Alert  Orientation Level: Oriented X4  Cognition: Follows commands     Hearing:     Skin:  Ace Wrap Intact with no drainage appreciated. Edema: Normal post-op inflammation   Range Of Motion:  AROM: Generally decreased, functional           PROM: Generally decreased, functional           Strength:    Strength: Generally decreased, functional                    Tone & Sensation:   Tone: Normal              Sensation: Intact               Coordination:  Coordination: Within functional limits  Vision:      Functional Mobility:  Bed Mobility:  Rolling: Contact guard assistance  Supine to Sit: Contact guard assistance  Sit to Supine: Contact guard assistance  Scooting: Contact guard assistance  Transfers:  Sit to Stand: Contact guard assistance  Stand to Sit: Contact guard assistance                       Balance:   Sitting: Intact  Standing: Intact; With support  Ambulation/Gait Training:  Distance (ft): 65 Feet (ft)  Assistive Device: Walker, rolling;Gait belt  Ambulation - Level of Assistance: Contact guard assistance        Gait Abnormalities: Antalgic;Decreased step clearance  Right Side Weight Bearing: As tolerated     Base of Support: Widened;Shift to left  Stance: Right decreased  Speed/Minnie: Slow  Step Length: Left shortened  Swing Pattern: Right asymmetrical     Interventions: Safety awareness training;Verbal cues               Therapeutic Exercises: Ankle Pumps  Ham Sets  Quad Sets  Glute Sets  Heel Slides  X 10 each every hour     Functional Measure:  Barthel Index:    Bathin  Bladder: 10  Bowels: 10  Groomin  Dressin  Feeding: 10  Mobility: 0  Stairs: 0  Toilet Use: 5  Transfer (Bed to Chair and Back): 10  Total: 55/100       The Barthel ADL Index: Guidelines  1. The index should be used as a record of what a patient does, not as a record of what a patient could do. 2. The main aim is to establish degree of independence from any help, physical or verbal, however minor and for whatever reason. 3. The need for supervision renders the patient not independent. 4. A patient's performance should be established using the best available evidence. Asking the patient, friends/relatives and nurses are the usual sources, but direct observation and common sense are also important. However direct testing is not needed. 5. Usually the patient's performance over the preceding 24-48 hours is important, but occasionally longer periods will be relevant. 6. Middle categories imply that the patient supplies over 50 per cent of the effort. 7. Use of aids to be independent is allowed. Score Interpretation (from [address] [address])    Independent   60-79 Minimally independent   40-59 Partially dependent   20-39 Very dependent   <20 Totally dependent     -Tony Nix., Barthel, D.W. (1965). Functional evaluation: the Barthel Index. [facility] ([address] (1997). The Barthel activities of daily living index: self-reporting versus actual performance in the old (> or = 75 years). Journal of [address] [place] [address] 45(7), [address] [facility], HARRY, Graham Chandra., Sarah Beth Pastrana. (1999). Measuring the change in disability after inpatient rehabilitation; comparison of the responsiveness of the Barthel Index and Functional [place] Measure.  Journal of Neurology, Neurosurgery, and Psychiatry, 66(4), [phone]. TELMA Flanagan, ODIN Gutierrez, & Nieyc Garcia M.A. (2004) Assessment of post-stroke quality of life in cost-effectiveness studies: The usefulness of the Barthel Index and the EuroQoL-5D. Quality of Life Research, 15, 172-27           Physical Therapy Evaluation Charge Determination   History Examination Presentation Decision-Making   LOW Complexity : Zero comorbidities / personal factors that will impact the outcome / POC LOW Complexity : 1-2 Standardized tests and measures addressing body structure, function, activity limitation and / or participation in recreation  LOW Complexity : Stable, uncomplicated  LOW Complexity : FOTO score of       Based on the above components, the patient evaluation is determined to be of the following complexity level: LOW     Pain Ratin/10    Activity Tolerance:   Good    After treatment patient left in no apparent distress:   Sitting in chair, Supine in bed, Call bell within reach, Side rails x 3, and nurse notified. COMMUNICATION/EDUCATION:   The patients plan of care was discussed with: Registered nurse, Physician, Case management, and Rehabilitation technician. Fall prevention education was provided and the patient/caregiver indicated understanding., Patient/family have participated as able in goal setting and plan of care. , and Patient/family agree to work toward stated goals and plan of care.     Thank you for this referral.  Chetan Salinas   Time Calculation: 30 mins

## 2023-03-21 VITALS
WEIGHT: 163 LBS | RESPIRATION RATE: 18 BRPM | DIASTOLIC BLOOD PRESSURE: 61 MMHG | TEMPERATURE: 98.2 F | OXYGEN SATURATION: 94 % | SYSTOLIC BLOOD PRESSURE: 122 MMHG | HEART RATE: 68 BPM | HEIGHT: 64 IN | BODY MASS INDEX: 27.83 KG/M2

## 2023-03-21 LAB
ANION GAP SERPL CALC-SCNC: 3 MMOL/L (ref 5–15)
BUN SERPL-MCNC: 18 MG/DL (ref 6–20)
BUN/CREAT SERPL: 20 (ref 12–20)
CALCIUM SERPL-MCNC: 8.3 MG/DL (ref 8.5–10.1)
CHLORIDE SERPL-SCNC: 111 MMOL/L (ref 97–108)
CO2 SERPL-SCNC: 27 MMOL/L (ref 21–32)
CREAT SERPL-MCNC: 0.9 MG/DL (ref 0.55–1.02)
GLUCOSE SERPL-MCNC: 138 MG/DL (ref 65–100)
HGB BLD-MCNC: 10 G/DL (ref 11.5–16)
POTASSIUM SERPL-SCNC: 3.8 MMOL/L (ref 3.5–5.1)
SODIUM SERPL-SCNC: 141 MMOL/L (ref 136–145)

## 2023-03-21 PROCEDURE — 97165 OT EVAL LOW COMPLEX 30 MIN: CPT

## 2023-03-21 PROCEDURE — 36415 COLL VENOUS BLD VENIPUNCTURE: CPT

## 2023-03-21 PROCEDURE — 2709999900 HC NON-CHARGEABLE SUPPLY

## 2023-03-21 PROCEDURE — 74011250636 HC RX REV CODE- 250/636: Performed by: PHYSICIAN ASSISTANT

## 2023-03-21 PROCEDURE — 97116 GAIT TRAINING THERAPY: CPT

## 2023-03-21 PROCEDURE — 97535 SELF CARE MNGMENT TRAINING: CPT

## 2023-03-21 PROCEDURE — 77030028907 HC WRP KNEE WO BGS SOLM -B

## 2023-03-21 PROCEDURE — 85018 HEMOGLOBIN: CPT

## 2023-03-21 PROCEDURE — 80048 BASIC METABOLIC PNL TOTAL CA: CPT

## 2023-03-21 PROCEDURE — 74011000250 HC RX REV CODE- 250: Performed by: PHYSICIAN ASSISTANT

## 2023-03-21 PROCEDURE — 74011250637 HC RX REV CODE- 250/637: Performed by: PHYSICIAN ASSISTANT

## 2023-03-21 RX ORDER — DEXAMETHASONE 4 MG/1
TABLET ORAL
Qty: 4 TABLET | Refills: 0 | Status: SHIPPED | OUTPATIENT
Start: 2023-03-21

## 2023-03-21 RX ADMIN — SODIUM CHLORIDE, PRESERVATIVE FREE 10 ML: 5 INJECTION INTRAVENOUS at 06:05

## 2023-03-21 RX ADMIN — CEFAZOLIN 2 G: 1 INJECTION, POWDER, FOR SOLUTION INTRAMUSCULAR; INTRAVENOUS at 00:25

## 2023-03-21 RX ADMIN — KETOROLAC TROMETHAMINE 15 MG: 30 INJECTION, SOLUTION INTRAMUSCULAR at 00:25

## 2023-03-21 RX ADMIN — FAMOTIDINE 20 MG: 20 TABLET ORAL at 08:30

## 2023-03-21 RX ADMIN — ACETAMINOPHEN 650 MG: 325 TABLET ORAL at 11:43

## 2023-03-21 RX ADMIN — KETOROLAC TROMETHAMINE 15 MG: 30 INJECTION, SOLUTION INTRAMUSCULAR at 11:43

## 2023-03-21 RX ADMIN — KETOROLAC TROMETHAMINE 15 MG: 30 INJECTION, SOLUTION INTRAMUSCULAR at 06:05

## 2023-03-21 RX ADMIN — ASPIRIN 81 MG: 81 TABLET, COATED ORAL at 08:30

## 2023-03-21 RX ADMIN — DOCUSATE SODIUM 50MG AND SENNOSIDES 8.6MG 1 TABLET: 8.6; 5 TABLET, FILM COATED ORAL at 08:30

## 2023-03-21 RX ADMIN — SODIUM CHLORIDE 125 ML/HR: 9 INJECTION, SOLUTION INTRAVENOUS at 06:05

## 2023-03-21 RX ADMIN — ACETAMINOPHEN 650 MG: 325 TABLET ORAL at 00:25

## 2023-03-21 RX ADMIN — ACETAMINOPHEN 650 MG: 325 TABLET ORAL at 06:05

## 2023-03-21 RX ADMIN — POLYETHYLENE GLYCOL 3350 17 G: 17 POWDER, FOR SOLUTION ORAL at 08:30

## 2023-03-21 NOTE — PROGRESS NOTES
Problem: Falls - Risk of  Goal: *Absence of Falls  Description: Document Kiet Solano Fall Risk and appropriate interventions in the flowsheet.   Outcome: Progressing Towards Goal  Note: Fall Risk Interventions:  Mobility Interventions: Communicate number of staff needed for ambulation/transfer, Patient to call before getting OOB         Medication Interventions: Patient to call before getting OOB, Teach patient to arise slowly    Elimination Interventions: Bed/chair exit alarm, Call light in reach              Problem: Patient Education: Go to Patient Education Activity  Goal: Patient/Family Education  Outcome: Progressing Towards Goal  Note: Pain management  Fall prevention  Diet       Problem: Patient Education: Go to Patient Education Activity  Goal: Patient/Family Education  Outcome: Progressing Towards Goal  Note: Pain management  Fall prevention  Diet

## 2023-03-21 NOTE — PROGRESS NOTES
Resting comfortably. GEN:  NAD.  AOx3   ABD:  S/NT/ND   RLE:  Dressing C/D/I , 5/5 motor, Calf nttp (Bilat), Sensation rossly intact to light touch throughout, 1+ dp/pt pulses, foot perfused    Patient Vitals for the past 24 hrs:   Temp Pulse Resp BP SpO2   03/21/23 0239 98 °F (36.7 °C) 61 16 (!) 150/74 94 %   03/20/23 2157 98.3 °F (36.8 °C) 65 17 (!) 151/77 98 %   03/20/23 1405 98.2 °F (36.8 °C) 66 16 137/69 95 %   03/20/23 1204 -- 66 16 123/69 97 %   03/20/23 1159 97.8 °F (36.6 °C) 68 16 (!) 161/70 97 %   03/20/23 1051 97.5 °F (36.4 °C) 61 15 (!) 155/70 98 %   03/20/23 1015 97.3 °F (36.3 °C) (!) 59 13 (!) 143/59 100 %   03/20/23 1000 -- 61 15 (!) 121/38 98 %   03/20/23 0955 -- 62 12 (!) 138/57 95 %   03/20/23 0950 -- 63 11 (!) 137/56 96 %   03/20/23 0945 -- 62 10 (!) 135/58 99 %   03/20/23 0940 -- 64 11 (!) 129/55 96 %   03/20/23 0936 97.6 °F (36.4 °C) 65 9 (!) 124/45 96 %       Current Facility-Administered Medications   Medication Dose Route Frequency    mirtazapine (REMERON) tablet 30 mg  30 mg Oral QHS    rosuvastatin (CRESTOR) tablet 5 mg  5 mg Oral QHS    0.9% sodium chloride infusion  125 mL/hr IntraVENous CONTINUOUS    sodium chloride 0.9 % bolus infusion 500 mL  500 mL IntraVENous ONCE PRN    sodium chloride (NS) flush 5-40 mL  5-40 mL IntraVENous Q8H    sodium chloride (NS) flush 5-40 mL  5-40 mL IntraVENous PRN    acetaminophen (TYLENOL) tablet 650 mg  650 mg Oral Q6H    oxyCODONE IR (ROXICODONE) tablet 5 mg  5 mg Oral Q3H PRN    HYDROmorphone (DILAUDID) injection 0.5 mg  0.5 mg IntraVENous Q4H PRN    naloxone (NARCAN) injection 0.4 mg  0.4 mg IntraVENous PRN    ondansetron (ZOFRAN) injection 4 mg  4 mg IntraVENous Q4H PRN    hydrOXYzine HCL (ATARAX) tablet 10 mg  10 mg Oral Q8H PRN    famotidine (PEPCID) tablet 20 mg  20 mg Oral BID    senna-docusate (PERICOLACE) 8.6-50 mg per tablet 1 Tablet  1 Tablet Oral BID    polyethylene glycol (MIRALAX) packet 17 g  17 g Oral DAILY    bisacodyL (DULCOLAX) suppository 10 mg  10 mg Rectal DAILY PRN    aspirin delayed-release tablet 81 mg  81 mg Oral BID    ketorolac (TORADOL) injection 15 mg  15 mg IntraVENous Q6H    traMADoL (ULTRAM) tablet 50 mg  50 mg Oral Q6H PRN    losartan/hydroCHLOROthiazide (HYZAAR) 100/25 mg   Oral QHS       Lab Results   Component Value Date/Time    HGB 10.0 (L) 03/21/2023 01:55 AM    INR 1.0 03/14/2023 12:30 PM       Lab Results   Component Value Date/Time    Sodium 141 03/21/2023 01:55 AM    Potassium 3.8 03/21/2023 01:55 AM    Chloride 111 (H) 03/21/2023 01:55 AM    CO2 27 03/21/2023 01:55 AM    BUN 18 03/21/2023 01:55 AM    Creatinine 0.90 03/21/2023 01:55 AM    Calcium 8.3 (L) 03/21/2023 01:55 AM    Magnesium 2.5 (H) 05/21/2020 10:30 AM            74 y.o. female s/p right total knee arthroplasty on 3/20/2023  . Doing well. Pain overall well controlled. ABX: Complete 24 hours perioperative abx  PATHWAY: Straight cath per protocol if needed  DVT Prophylaxis: Aspirin 81 mg enteric coated BID  Weight Bearing: WBAT RLE   Pain Control: Toradol, Tylenol, tramadol every 6 hours,   Disposition: Home goal for later today once cleared by PT, HHPT    Has ASA 81mg BID at home, has tramadol at home, intolerant to oral NSAIDS.  Scripts sent appropriately

## 2023-03-21 NOTE — PROGRESS NOTES
Ortho NP Note    POD# 1  s/p RIGHT TOTAL KNEE ARTHROPLASTY   Pt seen with  present    Pt seated in chair. Reports pain is minimal, using primarily tylenol, toradol. States she has needed medications at discharge and is declining pain medication rx at this time. Up with physical therapy this morning, felt well walking. No CP, no SOB. No dizziness, lightheadedness. Ate breakfast, no N/V.      VSS Afebrile. Visit Vitals  /61 (BP 1 Location: Left upper arm, BP Patient Position: At rest)   Pulse 68   Temp 98.2 °F (36.8 °C)   Resp 18   Ht 5' 3.5\" (1.613 m)   Wt 73.9 kg (163 lb)   SpO2 94%   BMI 28.42 kg/m²       Voiding status: spontaneous void   Output (mL)  Urine Voided: 550 ml (03/20/23 1156)  Last Bowel Movement Date: 03/19/23 (03/20/23 1055)  Unmeasurable Output  Urine Occurrence(s): 1 (03/20/23 1942)      Labs    Lab Results   Component Value Date/Time    HGB 10.0 (L) 03/21/2023 01:55 AM      Lab Results   Component Value Date/Time    INR 1.0 03/14/2023 12:30 PM      Lab Results   Component Value Date/Time    Sodium 141 03/21/2023 01:55 AM    Potassium 3.8 03/21/2023 01:55 AM    Chloride 111 (H) 03/21/2023 01:55 AM    CO2 27 03/21/2023 01:55 AM    Glucose 138 (H) 03/21/2023 01:55 AM    BUN 18 03/21/2023 01:55 AM    Creatinine 0.90 03/21/2023 01:55 AM    Calcium 8.3 (L) 03/21/2023 01:55 AM     Recent Glucose Results:   Lab Results   Component Value Date/Time     (H) 03/21/2023 01:55 AM           Aquacel dressing to right knee c.d.i  Cryotherapy in place over incision  Calves soft and supple; No pain with passive stretch  Bilateral LEs warm, dry. 2+ DP pulses. Sensation and motor intact - PF/DF/EHL intact 5/5  Foot Pumps for mechanical DVT proph while in bed     PLAN: Patient seen following therapy clearance. I have reviewed progress note and am in agreement with assessment and plan as documented by BAR Aguilar.   In preparation for discharged, reviewed the following in room with patient. 1) PT: BID WBAT in hospital.  Therapy to be continued at home with HH--CM involved to arrange, [facility]  2) Anticoagulation: ASA 81 mg PO BID for DVT Prophylaxis. Encouraged ongoing mobilization, bed exercises. 3) Pain - Multimodal approach including cryotherapy, scheduled tylenol, toradol with PRN oxycodone, IV dilaudid, tramadol while in hospital.  States has needed pain, medications at home--declining need for meds to be sent at discharge. Discussed precautions for narcotic use: avoid driving, ETOH, other sedating medications. 4) Post op care: Continue bowel regimen, encouraged IS. Follow up in 3-4 weeks with Dr Cely Renteria. Aquacel to remain in place x 7 days unless integrity is lost.   5) Post operative anemia: Hgb at PAT  on 3/14: 12.6. EBL: 100 mL. Hgb on POD 1: 10.0. No active bleeding on exam, hemodynamically stable. Expected Acute blood loss post-op anemia  6) Readiness for discharge: Per primary team, discharge following PT clearance     [x] Vital Signs stable    [x] + Voiding    [x] Wound intact, drainage minimal    [x] Tolerating PO intake     [x] Cleared by PT (OT if applicable)     [] Stair training completed (if applicable)    [] Independent / Contact Guard Assist (household distance)     [] Bed mobility     [] Car transfers     [] ADLs    [x] Adequate pain control on oral medication alone     [facility] and family support today.     Trace Haile NP  Available via [employer]

## 2023-03-21 NOTE — PROGRESS NOTES
Transition of Care: Plan for discharge home with spouse and HH. [facility] [address] has accepted patient. Patient owns RW. There are 2 stairs to enter the home. Family will transport patient home     The Plan for Transition of Care is related to the following treatment goals: home health, patient prefers [facility] [address]    The Patient and/or patient representative  was provided with a choice of provider and agrees   with the discharge plan. [x] Yes [] No    Freedom of choice list was provided with basic dialogue that supports the patient's individualized plan of care/goals, treatment preferences and shares the quality data associated with the providers.  [x] Yes [] No      ANI Amin/JASMYN

## 2023-03-21 NOTE — PROGRESS NOTES
OCCUPATIONAL THERAPY EVALUATION/DISCHARGE  Patient: Alfredito Ro (44 y.o. female)  Date: 3/21/2023  Primary Diagnosis: Primary osteoarthritis of right knee [M17.11]  Procedure(s) (LRB):  RIGHT TOTAL KNEE ARTHROPLASTY (Right) 1 Day Post-Op   Precautions:   Fall, WBAT    ASSESSMENT  Based on the objective data described below, the patient presents with decreased independence with self care and functional mobility following admission for R TKR. She progressed to the bathroom for voiding her bladder and to the chair for bathing activities. She completed bathing with setup and dressing activities with min A. Pt did well with all activities overall. She recalls a lot of education from prior L TKR. She did well with all activities. Pt has no further need for OT intervention. Recommend home with support. Pt is cleared for discharge home. Recommend home with support from family/friends as needed with basic ADL and IADL activities. Current Level of Function (ADLs/self-care): min A    Functional Outcome Measure: The patient scored 70 on the Barthel Index outcome measure which is indicative of 30% impairment. Other factors to consider for discharge: pain, debility     PLAN :  Recommend with staff: OOB to chair TID for meals. Recommend progression to and from bathroom with use of walker and gait belt for toileting. Recommendation for discharge: (in order for the patient to meet his/her long term goals)  No skilled occupational therapy/ follow up rehabilitation needs identified at this time. This discharge recommendation:  Has been made in collaboration with the attending provider and/or case management    IF patient discharges home will need the following DME: none       SUBJECTIVE:   Patient stated I am feeling alright.     OBJECTIVE DATA SUMMARY:   HISTORY:   Past Medical History:   Diagnosis Date    Cancer ([facility] [address]     [address] AND [facility] - MULTIPLE    COVID-19 06/12/2022    GERD (gastroesophageal reflux disease) 1970    Hypertension     Psychiatric disorder     ANXIETY    Rheumatoid arteritis ([facility] [address]     Sleep apnea     CPAP     Past Surgical History:   Procedure Laterality Date    HX APPENDECTOMY  1963    HX CATARACT REMOVAL Right     HX COLONOSCOPY      HX ENDOSCOPY      HX HEART CATHETERIZATION  2020    NO STENT    HX KNEE REPLACEMENT Left 2022       Prior Level of Function/Environment/Context: pt is independent at home prior to admission. Expanded or extensive additional review of patient history:   Home Situation  Home Environment: Private residence  # Steps to Enter: 2  Rails to Enter: Yes  Hand Rails : Right  Wheelchair Ramp: No  One/Two Story Residence: One story  Living Alone: No  Support Systems: Spouse/Significant Other  Patient Expects to be Discharged to[de-identified] Home with home health  Current DME Used/Available at Home: Cane, straight, Walker, rolling, Raised toilet seat, Shower chair  Tub or Shower Type: Shower    Hand dominance: Right    EXAMINATION OF PERFORMANCE DEFICITS:  Cognitive/Behavioral Status:  Neurologic State: Alert  Orientation Level: Oriented X4  Cognition: Appropriate for age attention/concentration  Perception: Appears intact  Perseveration: No perseveration noted  Safety/Judgement: Good awareness of safety precautions    Skin: see nursing notes    Edema: none noted    Hearing: Auditory  Auditory Impairment: None    Vision/Perceptual:                           Acuity: Within Defined Limits         Range of Motion:    AROM: Within functional limits  PROM: Within functional limits                      Strength:    Strength: Within functional limits                Coordination:  Coordination: Within functional limits  Fine Motor Skills-Upper: Right Intact; Left Intact    Gross Motor Skills-Upper: Right Intact; Left Intact    Tone & Sensation:    Tone: Normal  Sensation: Intact                      Balance:  Sitting: Intact  Standing: Intact; With support    Functional Mobility and Transfers for ADLs:  Bed Mobility:  Supine to Sit: Additional time;Supervision  Sit to Supine:  (remained in the chair awaiting PE)    Transfers:  Sit to Stand: Supervision  Stand to Sit: Supervision  Bed to Chair: Supervision  Bathroom Mobility: Supervision/set up  Toilet Transfer : Supervision    ADL Assessment:  Feeding: Independent    Oral Facial Hygiene/Grooming: Supervision    Bathing: Additional time;Supervision         Upper Body Dressing: Supervision    Lower Body Dressing: Additional time;Minimum assistance    Toileting: Supervision                ADL Intervention and task modifications:     Dressing training following TKR:  Pt participated with ADL routine while sitting in the recliner chair. Pt provided setup for sponge bathing and given instructions for safety and energy conservation with bathing activities. Pt completed lower body bathing with min A. Pt provided training and education for donning clothing over surgical leg first.  Pt completed donning underpants with supervision, pants with min A, socks with min A, and shoes with min A. Pt did well with eduction and training but will benefit from reinforcement of education provided. Following intervention, pt left sitting in the chair. Cognitive Retraining  Safety/Judgement: Good awareness of safety precautions    Functional Measure:    Barthel Index:  Bathin  Bladder: 10  Bowels: 10  Groomin  Dressin  Feeding: 10  Mobility: 10  Stairs: 5  Toilet Use: 5  Transfer (Bed to Chair and Back): 10  Total: 70/100      The Barthel ADL Index: Guidelines  1. The index should be used as a record of what a patient does, not as a record of what a patient could do. 2. The main aim is to establish degree of independence from any help, physical or verbal, however minor and for whatever reason. 3. The need for supervision renders the patient not independent.   4. A patient's performance should be established using the best available evidence. Asking the patient, friends/relatives and nurses are the usual sources, but direct observation and common sense are also important. However direct testing is not needed. 5. Usually the patient's performance over the preceding 24-48 hours is important, but occasionally longer periods will be relevant. 6. Middle categories imply that the patient supplies over 50 per cent of the effort. 7. Use of aids to be independent is allowed. Score Interpretation (from [address] [facility] [address])    Independent   60-79 Minimally independent   40-59 Partially dependent   20-39 Very dependent   <20 Totally dependent     -Tony Nix., Barthel, D.W. (1965). Functional evaluation: the Barthel Index. [address] ([address] (1997). The Barthel activities of daily living index: self-reporting versus actual performance in the old (> or = 75 years). Journal of [address] [place] [address] 45(7), 14 [facility], RajivRajivRajiv, Nirali Ness., Kiana Market. (1999). Measuring the change in disability after inpatient rehabilitation; comparison of the responsiveness of the Barthel Index and Functional [place] Measure. Journal of Neurology, Neurosurgery, and Psychiatry, 66(4), 377-726. Abhishek Douglas, N.J.A, ODIN Gutierrez, & Milla Espitia MRajivA. (2004) Assessment of post-stroke quality of life in cost-effectiveness studies: The usefulness of the Barthel Index and the EuroQoL-5D.  Quality of Life Research, 15, 190-02        Occupational Therapy Evaluation Charge Determination   History Examination Decision-Making   LOW Complexity : Brief history review  LOW Complexity : 1-3 performance deficits relating to physical, cognitive , or psychosocial skils that result in activity limitations and / or participation restrictions  LOW Complexity : No comorbidities that affect functional and no verbal or physical assistance needed to complete eval tasks       Based on the above components, the patient evaluation is determined to be of the following complexity level: LOW   Pain Ratin/10 pain in the knee    Activity Tolerance:   Good    After treatment patient left in no apparent distress:    Sitting in chair and Call bell within reach    COMMUNICATION/EDUCATION:   The patients plan of care was discussed with: Physical therapist and Registered nurse.      Thank you for this referral.  Harlan Tesfaye OT  Time Calculation: 33 mins

## 2023-03-21 NOTE — PROGRESS NOTES
Problem: Mobility Impaired (Adult and Pediatric)  Goal: *Acute Goals and Plan of Care (Insert Text)  Description: FUNCTIONAL STATUS PRIOR TO ADMISSION: Patient was independent and active without use of DME.    HOME SUPPORT PRIOR TO ADMISSION: The patient lived with  but did not require assist.    Physical Therapy Goals  Initiated 3/20/2023    1. Patient will move from supine to sit and sit to supine , scoot up and down, and roll side to side in bed with modified independence within 4 days. 2. Patient will perform sit to stand with modified independence within 4 days. 3. Patient will ambulate with modified independence for 150 feet with the least restrictive device within 4 days. 4. Patient will ascend/descend 4 stairs with cane and one handrail(s) with modified independence within 4 days. 5. Patient will perform home exercise program per protocol with independence within 4 days. 6. Patient will demonstrate AROM 0-90 degrees in operative joint within 4 days. Outcome: Resolved/Met   PHYSICAL THERAPY TREATMENT/DISCHARGE  Patient: Juan Thompson (24 y.o. female)  Date: 3/21/2023  Diagnosis: Primary osteoarthritis of right knee [M17.11] <principal problem not specified>  Procedure(s) (LRB):  RIGHT TOTAL KNEE ARTHROPLASTY (Right) 1 Day Post-Op  Precautions: Fall, WBAT  Chart, physical therapy assessment, plan of care and goals were reviewed. ASSESSMENT  Patient continues with skilled PT services and has met acute care PT goals. Patient is cleared for discharge from PT standpoint. Patient  is independent with post-op TKA exercise protocol and has same in written, illlustrated form. PT Discharge instructions reviewed. Patient and  demonstrated understanding and watched Discharge Video. Barthel Functional score has improved to 90/100, indicating minimal impaired ability to care for basic self-needs/dependency on others.                PLAN :  Patient will be discharged from acute skilled physical therapy at this time. Rationale for discharge:  Goals achieved    Recommendation for discharge: (in order for the patient to meet his/her long term goals)  Physical therapy at least 2 days/week in the home     This discharge recommendation:  Has been made in collaboration with the attending provider and/or case management    IF patient discharges home will need the following DME: patient owns DME required for discharge       SUBJECTIVE:   Patient stated I am ready to go. Nuha Layla    OBJECTIVE DATA SUMMARY:   Critical Behavior:  Neurologic State: Alert  Orientation Level: Oriented X4  Cognition: Appropriate for age attention/concentration  Safety/Judgement: Good awareness of safety precautions  Functional Mobility Training:  Bed Mobility:  Rolling: Supervision  Supine to Sit: Supervision  Sit to Supine: Supervision  Scooting: Supervision        Transfers:  Sit to Stand: Supervision  Stand to Sit: Supervision        Bed to Chair: Supervision                    Balance:  Sitting: Intact  Standing: Intact; With support  Ambulation/Gait Training:  Distance (ft): 150 Feet (ft)  Assistive Device: Walker, rolling;Gait belt  Ambulation - Level of Assistance: Supervision        Gait Abnormalities: Antalgic  Right Side Weight Bearing: As tolerated     Base of Support: Widened;Shift to left  Stance: Right decreased  Speed/Minnie: Slow  Step Length: Left shortened  Swing Pattern: Right asymmetrical     Interventions: Safety awareness training;Verbal cues           Stairs:  Number of Stairs Trained: 4  Stairs - Level of Assistance: Supervision   Rail Use: Both    Therapeutic Exercises:   Patient  is independent with post-op TKA exercise protocol and has same in written, illlustrated form. Barthel Index:    Bathin  Bladder: 10  Bowels: 10  Groomin  Dressin  Feeding: 10  Mobility: 15  Stairs: 10  Toilet Use: 10  Transfer (Bed to Chair and Back): 15  Total: 90/100       The Barthel ADL Index: Guidelines  1. The index should be used as a record of what a patient does, not as a record of what a patient could do. 2. The main aim is to establish degree of independence from any help, physical or verbal, however minor and for whatever reason. 3. The need for supervision renders the patient not independent. 4. A patient's performance should be established using the best available evidence. Asking the patient, friends/relatives and nurses are the usual sources, but direct observation and common sense are also important. However direct testing is not needed. 5. Usually the patient's performance over the preceding 24-48 hours is important, but occasionally longer periods will be relevant. 6. Middle categories imply that the patient supplies over 50 per cent of the effort. 7. Use of aids to be independent is allowed. Score Interpretation (from [address] [address])    Independent   60-79 Minimally independent   40-59 Partially dependent   20-39 Very dependent   <20 Totally dependent     -Tony Nix., BarthelYOANNAW. (1965). Functional evaluation: the Barthel Index. [address] [facility] ([address] (1997). The Barthel activities of daily living index: self-reporting versus actual performance in the old (> or = 75 years). Journal of [address] [place] [address] 457), 14 [facility], UZAIR, Pawel Rios., Andrés Lawrence. (1999). Measuring the change in disability after inpatient rehabilitation; comparison of the responsiveness of the Barthel Index and Functional [place] Measure. Journal of Neurology, Neurosurgery, and Psychiatry, 66(4), 338-646. Susan Castrejon, N.J.HARRISON, DOIN Gutierrez, & Davis Rob, M.A. (2004) Assessment of post-stroke quality of life in cost-effectiveness studies: The usefulness of the Barthel Index and the EuroQoL-5D.  Quality of Life Research, 13, 427-43      Pain Rating:  3/10    Activity Tolerance:   Good    After treatment patient left in no apparent distress:   Sitting in chair, Call bell within reach, Caregiver / family present, and nurse notified. COMMUNICATION/COLLABORATION:   The patients plan of care was discussed with: Occupational therapist, Registered nurse, Physician, and Case management.      Burnis Claude   Time Calculation: 25 mins

## 2023-03-22 NOTE — PROGRESS NOTES
[facility]  [facility]     FROM:                                TO: At [address]                                                      ([facility] or Facility name)  Barbara Wolfe 53  [address] [place] [address]  Dept: [address]: [phone]                                      Room#:  556/01                                                       Nurse Navigator: Ayanna Gutierres RN         SITUATION      ASAScore: ASA 2 - Patient with mild systemic disease with no functional limitations    Admitted:  3/20/2023  Hospital Day: 2      Attending Provider:  No att. providers found     Consultations:  None    PCP:  Reggie Kimbrough MD   [phone]     Admitting Dx:  Primary osteoarthritis of right knee [M17.11]       Active Problems:    Primary osteoarthritis of right knee (3/20/2023)      2 Days Post-Op of   Procedure(s):  RIGHT TOTAL KNEE ARTHROPLASTY   BY: Ivan Sosa MD             ON: 3/20/2023                  Code Status: Prior             Advance Directive? Yes Not W Pt (Send w/patient)     Isolation:  There are currently no Active Isolations       MDRO: No current active infections    BACKGROUND     Allergies:   Allergies   Allergen Reactions    Ceclor [Cefaclor] Rash    Keflex [Cephalexin] Rash    Mobic [Meloxicam] Hives    Penicillamine Rash    Penicillins Rash     NO REPORTED SEVERE NON-IGE-MEDICATED REACTIONS       Past Medical History:   Diagnosis Date    Cancer ([facility] [address]     [facility] AND [facility] - MULTIPLE    COVID-19 06/12/2022    GERD (gastroesophageal reflux disease) 1970    Hypertension     Psychiatric disorder     ANXIETY    Rheumatoid arteritis ([facility] [address]     Sleep apnea     CPAP       Past Surgical History:   Procedure Laterality Date    HX APPENDECTOMY  1963    HX CATARACT REMOVAL Right     HX COLONOSCOPY      HX ENDOSCOPY      HX HEART CATHETERIZATION  2020    NO STENT    HX KNEE REPLACEMENT Left 2022       Prior to Admission Medications   Prescriptions Last Dose Informant Patient Reported? Taking? B infantis/B ani/B katlin/B bifid (PROBIOTIC 4X PO) 3/13/2023  Yes Yes   Sig: Take 1 Tablet by mouth daily. Biotin 2,500 mcg cap 3/13/2023  Yes Yes   Sig: Take 5,000 mcg by mouth daily. CRANBERRY PO 3/13/2023  Yes Yes   Sig: Take 1 Tablet by mouth daily. Calcium-Cholecalciferol, D3, (Calcium 600 with Vitamin D3) 600 mg(1,500mg) -400 unit chew 3/13/2023  Yes Yes   Sig: Take 1 Tab by mouth. LORazepam (ATIVAN) 0.5 mg tablet 3/19/2023 at 1800  Yes Yes   Sig: Take 0.5 mg by mouth every eight (8) hours as needed. TURMERIC PO 3/13/2023  Yes Yes   Sig: Take 1 Tablet by mouth daily. acetaminophen (TYLENOL) 325 mg tablet 3/19/2023 at 0900  No Yes   Sig: Take 2 Tablets by mouth every six (6) hours. ascorbic acid, vitamin C, (VITAMIN C) 500 mg tablet 3/13/2023  Yes Yes   Sig: Take 500 mg by mouth daily. aspirin 81 mg chewable tablet 3/19/2023  Yes Yes   Sig: Take 81 mg by mouth daily. cholecalciferol, vitamin D3, 50 mcg (2,000 unit) tab 3/13/2023  Yes Yes   Sig: Take 1 Tablet by mouth daily. esomeprazole (NEXIUM) 20 mg capsule 3/20/2023 at 0500  Yes Yes   Sig: Take  by mouth daily. glucosamine sulfate (GLUCOSAMINE PO) 3/13/2023  Yes Yes   Sig: Take 1 Tablet by mouth daily. 500MG   hydrOXYchloroQUINE (PLAQUENIL) 200 mg tablet 3/19/2023 at 0900  Yes Yes   Sig: Take 200 mg by mouth daily. losartan-hydroCHLOROthiazide (HYZAAR) 100-25 mg per tablet 3/19/2023 Self Yes Yes   Sig: Take 1 Tablet by mouth nightly. mirtazapine (REMERON) 15 mg tablet 3/19/2023 at 1900  Yes Yes   Sig: Take 30 mg by mouth nightly. multivitamin (ONE A DAY) tablet 3/13/2023  Yes Yes   Sig: Take 1 Tablet by mouth daily. omega 3-dha-epa-fish oil (Fish Oil) 100-160-1,000 mg cap 3/13/2023  Yes Yes   Sig: Take 1 Cap by mouth daily. rosuvastatin (CRESTOR) 5 mg tablet 3/19/2023 at 1900  Yes Yes   Sig: Take 5 mg by mouth nightly. Facility-Administered Medications: None       Vaccinations: There is no immunization history on file for this patient. ASSESSMENT   Age: 74 y.o. Gender: female        Height: Height: 5' 3.5\" (161.3 cm)                    Weight:Weight: 73.9 kg (163 lb)     No data found. Active Orders   There are no active orders of the following types: Diet. Orientation: Orientation Level: Oriented X4    Active Lines/Drains:  (Peg Tube / Alvarez / CL or S/L?):no    Urinary Status: Voiding      Last BM: Last Bowel Movement Date: 03/19/23     Skin Integrity: Incision (comment)             Mobility: Slightly limited   Weight Bearing Status: WBAT (Weight Bearing as Tolerated)      Gait Training  Assistive Device: Walker, rolling, Gait belt  Ambulation - Level of Assistance: Supervision  Distance (ft): 150 Feet (ft)  Stairs - Level of Assistance: Supervision  Number of Stairs Trained: 4  Rail Use: Both  Interventions: Safety awareness training, Verbal cues     On Anticoagulation? YES  Aspirin                                         Pain Medications given:  Tramadol, Tylenol                                   Lab Results   Component Value Date/Time    Glucose 138 (H) 03/21/2023 01:55 AM    Hemoglobin A1c 5.4 03/14/2023 12:30 PM    INR 1.0 03/14/2023 12:30 PM    INR 1.0 06/29/2022 09:50 AM    HGB 10.0 (L) 03/21/2023 01:55 AM    HGB 12.6 03/14/2023 12:30 PM    HGB 10.9 (L) 07/14/2022 01:41 AM    HGB 12.9 06/29/2022 09:50 AM       Readmission Risks:  Score:         RECOMMENDATION     See After Visit Summary (AVS) for:  Discharge instructions  After [facility]   Medication Reconciliation          [facility] Orthopaedic Nurse Navigator  TAO Rahman, RN      Office  [phone]  Fax      [phone]  [email or web address]             . Essence

## 2024-03-15 ENCOUNTER — TRANSCRIBE ORDERS (OUTPATIENT)
Facility: HOSPITAL | Age: 74
End: 2024-03-15

## 2024-03-15 DIAGNOSIS — M48.061 SPINAL STENOSIS OF LUMBAR REGION, UNSPECIFIED WHETHER NEUROGENIC CLAUDICATION PRESENT: Primary | ICD-10-CM

## 2024-03-25 ENCOUNTER — HOSPITAL ENCOUNTER (OUTPATIENT)
Facility: HOSPITAL | Age: 74
Discharge: HOME | End: 2024-03-28
Payer: MEDICARE

## 2024-03-25 DIAGNOSIS — M48.061 SPINAL STENOSIS OF LUMBAR REGION, UNSPECIFIED WHETHER NEUROGENIC CLAUDICATION PRESENT: ICD-10-CM

## 2024-03-25 PROCEDURE — 72148 MRI LUMBAR SPINE W/O DYE: CPT

## (undated) DEVICE — 4-PORT MANIFOLD: Brand: NEPTUNE 2

## (undated) DEVICE — SUTURE VCRL 1 L27IN ABSRB CT BRAID COAT UD J281H

## (undated) DEVICE — CONTAINER,SPECIMEN,4OZ,OR STRL: Brand: MEDLINE

## (undated) DEVICE — PADDING CAST SPEC 6INX4YD COT --

## (undated) DEVICE — HOOD, PEEL-AWAY: Brand: FLYTE

## (undated) DEVICE — SOLUTION IRRIG 3000ML 0.9% SOD CHL USP UROMATIC PLAS CONT

## (undated) DEVICE — Device

## (undated) DEVICE — PREP SKN CHLRAPRP APL 26ML STR --

## (undated) DEVICE — TOTAL JOINT - SMH: Brand: MEDLINE INDUSTRIES, INC.

## (undated) DEVICE — GOWN,SIRUS,NONRNF,SETINSLV,2XL,18/CS: Brand: MEDLINE

## (undated) DEVICE — PADDING CST 6IN STERILE --

## (undated) DEVICE — DRAPE EQUIP ROBOT STRL VELYS 20/PK ORDER IN MULTIIPLES OF 20 EACH

## (undated) DEVICE — SOLUTION SURG PREP 26 CC PURPREP

## (undated) DEVICE — CUFF RMFG BP INF SZ 11 DISP -- LAWSON OEM ITEM 238915

## (undated) DEVICE — CONTAINER SPEC 20 ML LID NEUT BUFF FORMALIN 10 % POLYPR STS

## (undated) DEVICE — TOTAL TRAY, 16FR 10ML SIL FOLEY, URN: Brand: MEDLINE

## (undated) DEVICE — CANNULA CUSH AD W/ 14FT TBG

## (undated) DEVICE — Device: Brand: JELCO

## (undated) DEVICE — HYPODERMIC SAFETY NEEDLE: Brand: MAGELLAN

## (undated) DEVICE — 3M™ CUROS™ DISINFECTING CAP FOR NEEDLELESS CONNECTORS 270/CARTON 20 CARTONS/CASE CFF1-270: Brand: CUROS™

## (undated) DEVICE — ZIMMER® STERILE DISPOSABLE TOURNIQUET CUFF WITH PLC, DUAL PORT, SINGLE BLADDER, 34 IN. (86 CM)

## (undated) DEVICE — ELECTRODE,RADIOTRANSLUCENT,FOAM,3PK: Brand: MEDLINE

## (undated) DEVICE — GLOVE SURG SZ 8 L12IN FNGR THK94MIL STD WHT LTX FREE

## (undated) DEVICE — ADHESIVE SKIN CLSR 0.7ML TOP DERMBND ADV

## (undated) DEVICE — BOWL BNE CEM MIX SPAT CURET SMARTMIX CTS

## (undated) DEVICE — DRESSING HYDROFIBER AQUACEL AG ADVANTAGE 3.5X12 IN

## (undated) DEVICE — PINNACLE INTRODUCER SHEATH: Brand: PINNACLE

## (undated) DEVICE — SUTURE STRATAFIX SYMMETRIC PDS + SZ 1 L18IN ABSRB VLT L48MM SXPP1A400

## (undated) DEVICE — MARKER,SKIN,WI/RULER AND LABELS: Brand: MEDLINE

## (undated) DEVICE — DRAPE,EXTREMITY,89X128,STERILE: Brand: MEDLINE

## (undated) DEVICE — SCRUB DRY SURG EZ SCRUB BRUSH PREOPERATIVE GRN

## (undated) DEVICE — PROCEDURE KIT FLUID MGMT CUST MAINFOLD STRL

## (undated) DEVICE — DERMABOND SKIN ADH 0.7ML -- DERMABOND ADVANCED 12/BX

## (undated) DEVICE — FCPS RAD JAW 4LC 240CM W/NDL -- BX/40

## (undated) DEVICE — BAG BELONG PT PERS CLEAR HANDL

## (undated) DEVICE — TUBING SUCT 12FR MAL ALUM SHFT FN CAP VENT UNIV CONN W/ OBT

## (undated) DEVICE — 2108 SERIES SAGITTAL BLADE AGGRESSIVE  (25.0 X 1.19 X 85.0MM)

## (undated) DEVICE — SPONGE GZ W4XL4IN COT 12 PLY TYP VII WVN C FLD DSGN STERILE

## (undated) DEVICE — VELYS ROBOT-ASSISTED SOLUTION ARRAY DRILL PIN 125 MM X 4 MM: Brand: VELYS

## (undated) DEVICE — TRANSFER SET 3": Brand: MEDLINE INDUSTRIES, INC.

## (undated) DEVICE — DRAPE,REIN 53X77,STERILE: Brand: MEDLINE

## (undated) DEVICE — BRUSH SCRB DRY NL CLN LF GRN --

## (undated) DEVICE — GLOVE SURG ALOE LF PF 6.5 -- SENSICARE

## (undated) DEVICE — REM POLYHESIVE ADULT PATIENT RETURN ELECTRODE: Brand: VALLEYLAB

## (undated) DEVICE — T4 HOOD

## (undated) DEVICE — CATH IV AUTOGRD BC BLU 22GA 25 -- INSYTE

## (undated) DEVICE — ATTUNE SOLO PINNING SYSTEM

## (undated) DEVICE — SUTURE MCRYL SZ 3-0 L27IN ABSRB UD L19MM PS-2 3/8 CIR PRIM Y427H

## (undated) DEVICE — DRESSING HYDROCOLLOID BORDER 35X10 IN ALUM PRIMASEAL

## (undated) DEVICE — BLADE SAW W098XL354IN THK0047IN CUT THK0047IN SAG

## (undated) DEVICE — CATH DIAG-D 6F MULTI PIG 155 5 -- IMPULSE 16599-302

## (undated) DEVICE — PENCIL SMK EVAC 10 FT BLADE ELECTRD ROCKER FOR TELSCP

## (undated) DEVICE — BASIN EMSIS 16OZ GRAPHITE PLAS KID SHP MOLD GRAD FOR ORAL

## (undated) DEVICE — ANGIO-SEAL STS PLUS VASCULAR CLOSURE DEVICE: Brand: ANGIO-SEAL

## (undated) DEVICE — GLOVE SURG SZ 65 L12IN FNGR THK79MIL GRN LTX FREE

## (undated) DEVICE — SUTURE VCRL SZ 2-0 L36IN ABSRB UD L40MM CT 1/2 CIR J957H

## (undated) DEVICE — BLUNTFILL: Brand: MONOJECT

## (undated) DEVICE — BANDAGE COMPR M W6INXL10YD WHT BGE VELC E MTRX HK AND LOOP

## (undated) DEVICE — 1200 GUARD II KIT W/5MM TUBE W/O VAC TUBE: Brand: GUARDIAN

## (undated) DEVICE — GLOVE SURG SZ 65 L12IN FNGR THK94MIL STD WHT LTX FREE

## (undated) DEVICE — SYR 20ML LL STRL LF --

## (undated) DEVICE — BLADE SAW W0.49XL3.15IN THK0.047IN CUT THK0.047IN REPL SAG

## (undated) DEVICE — (D)SENSOR RMFG 02 PULS OXMTR -- DISC BY MFR USE ITEM 133445

## (undated) DEVICE — BITEBLOCK ENDOSCP 60FR MAXI WHT POLYETH STURDY W/ VELC WVN

## (undated) DEVICE — SPONGE LAPAROTOMY W18XL18IN WHITE STRUNG RADIOPAQUE STERILE

## (undated) DEVICE — BLADE SAW OSCILLATING 85X19X2 MM ROBOTIC-ASSISTED VELYS

## (undated) DEVICE — INTENT OT USE PROVIDES A STERILE INTERFACE BETWEEN THE OPERATING ROOM SURGICAL LAMPS (NON-STERILE) AND THE SURGEON OR STAFF WORKING IN THE STERILE FIELD.: Brand: ASPEN® ALC PLUS LIGHT HANDLE COVER

## (undated) DEVICE — SOLUTION IRRIG 1000ML STRL H2O USP PLAS POUR BTL

## (undated) DEVICE — PACK PROCEDURE SURG HRT CATH

## (undated) DEVICE — VELYS ROBOTIC-ASSISTED SOLUTION ARRAY SET - KNEE: Brand: VELYS

## (undated) DEVICE — KIT COLON W/ 1.1OZ LUB AND 2 END

## (undated) DEVICE — STAPLER SKIN H3.9MM WIRE DIA0.58MM CRWN 6.9MM 35 STPL ROT

## (undated) DEVICE — GLOVE ORTHO 8   MSG9480

## (undated) DEVICE — PIN DRL 4X125 MM ROBOTIC-ASSISTED SOLUTION ARRY VELYS

## (undated) DEVICE — SOLUTION IRRIG 1000ML 0.9% SOD CHL USP POUR PLAS BTL

## (undated) DEVICE — SET ADMIN 16ML TBNG L100IN 2 Y INJ SITE IV PIGGY BK DISP (ORDER IN MULIPLES OF 48)

## (undated) DEVICE — SOLIDIFIER FLD 2OZ 1500CC N DISINF IN BTL DISP SAFESORB

## (undated) DEVICE — DRAPE EQUIP SATELLITE STN STRL VELYS

## (undated) DEVICE — SPONGE GZ W4XL4IN COT 12 PLY TYP VII WVN C FLD DSGN

## (undated) DEVICE — BAG SPEC BIOHZRD 10 X 10 IN --